# Patient Record
Sex: FEMALE | Race: BLACK OR AFRICAN AMERICAN | NOT HISPANIC OR LATINO | Employment: PART TIME | URBAN - METROPOLITAN AREA
[De-identification: names, ages, dates, MRNs, and addresses within clinical notes are randomized per-mention and may not be internally consistent; named-entity substitution may affect disease eponyms.]

---

## 2017-06-30 ENCOUNTER — HOSPITAL ENCOUNTER (EMERGENCY)
Facility: HOSPITAL | Age: 32
Discharge: HOME | End: 2017-07-01
Attending: EMERGENCY MEDICINE | Admitting: EMERGENCY MEDICINE

## 2017-06-30 DIAGNOSIS — R00.2 PALPITATIONS: Primary | ICD-10-CM

## 2017-06-30 PROCEDURE — 99284 EMERGENCY DEPT VISIT MOD MDM: CPT

## 2017-06-30 PROCEDURE — 96360 HYDRATION IV INFUSION INIT: CPT

## 2017-07-01 ENCOUNTER — APPOINTMENT (OUTPATIENT)
Dept: GENERAL RADIOLOGY | Facility: HOSPITAL | Age: 32
End: 2017-07-01

## 2017-07-01 ENCOUNTER — APPOINTMENT (OUTPATIENT)
Dept: CARDIOLOGY | Facility: HOSPITAL | Age: 32
End: 2017-07-01

## 2017-07-01 VITALS
BODY MASS INDEX: 40.49 KG/M2 | HEART RATE: 106 BPM | WEIGHT: 258 LBS | HEIGHT: 67 IN | OXYGEN SATURATION: 98 % | SYSTOLIC BLOOD PRESSURE: 160 MMHG | DIASTOLIC BLOOD PRESSURE: 82 MMHG | TEMPERATURE: 98.8 F | RESPIRATION RATE: 16 BRPM

## 2017-07-01 LAB
ALBUMIN SERPL-MCNC: 4.3 G/DL (ref 3.5–5.2)
ALBUMIN/GLOB SERPL: 1.4 G/DL
ALP SERPL-CCNC: 82 U/L (ref 39–117)
ALT SERPL W P-5'-P-CCNC: 13 U/L (ref 1–33)
ANION GAP SERPL CALCULATED.3IONS-SCNC: 12.3 MMOL/L
AST SERPL-CCNC: 15 U/L (ref 1–32)
BASOPHILS # BLD AUTO: 0.02 10*3/MM3 (ref 0–0.2)
BASOPHILS NFR BLD AUTO: 0.3 % (ref 0–1.5)
BILIRUB SERPL-MCNC: 0.3 MG/DL (ref 0.1–1.2)
BUN BLD-MCNC: 7 MG/DL (ref 6–20)
BUN/CREAT SERPL: 8.3 (ref 7–25)
CALCIUM SPEC-SCNC: 9 MG/DL (ref 8.6–10.5)
CHLORIDE SERPL-SCNC: 100 MMOL/L (ref 98–107)
CO2 SERPL-SCNC: 25.7 MMOL/L (ref 22–29)
CREAT BLD-MCNC: 0.84 MG/DL (ref 0.57–1)
DEPRECATED RDW RBC AUTO: 42.4 FL (ref 37–54)
EOSINOPHIL # BLD AUTO: 0.14 10*3/MM3 (ref 0–0.7)
EOSINOPHIL NFR BLD AUTO: 2.1 % (ref 0.3–6.2)
ERYTHROCYTE [DISTWIDTH] IN BLOOD BY AUTOMATED COUNT: 13.2 % (ref 11.7–13)
GFR SERPL CREATININE-BSD FRML MDRD: 96 ML/MIN/1.73
GLOBULIN UR ELPH-MCNC: 3.1 GM/DL
GLUCOSE BLD-MCNC: 118 MG/DL (ref 65–99)
HCG SERPL QL: NEGATIVE
HCT VFR BLD AUTO: 39.2 % (ref 35.6–45.5)
HGB BLD-MCNC: 12.4 G/DL (ref 11.9–15.5)
IMM GRANULOCYTES # BLD: 0 10*3/MM3 (ref 0–0.03)
IMM GRANULOCYTES NFR BLD: 0 % (ref 0–0.5)
LYMPHOCYTES # BLD AUTO: 1.99 10*3/MM3 (ref 0.9–4.8)
LYMPHOCYTES NFR BLD AUTO: 29.6 % (ref 19.6–45.3)
MCH RBC QN AUTO: 27.5 PG (ref 26.9–32)
MCHC RBC AUTO-ENTMCNC: 31.6 G/DL (ref 32.4–36.3)
MCV RBC AUTO: 86.9 FL (ref 80.5–98.2)
MONOCYTES # BLD AUTO: 0.34 10*3/MM3 (ref 0.2–1.2)
MONOCYTES NFR BLD AUTO: 5.1 % (ref 5–12)
NEUTROPHILS # BLD AUTO: 4.23 10*3/MM3 (ref 1.9–8.1)
NEUTROPHILS NFR BLD AUTO: 62.9 % (ref 42.7–76)
PLATELET # BLD AUTO: 291 10*3/MM3 (ref 140–500)
PMV BLD AUTO: 11.9 FL (ref 6–12)
POTASSIUM BLD-SCNC: 3.9 MMOL/L (ref 3.5–5.2)
PROT SERPL-MCNC: 7.4 G/DL (ref 6–8.5)
RBC # BLD AUTO: 4.51 10*6/MM3 (ref 3.9–5.2)
SODIUM BLD-SCNC: 138 MMOL/L (ref 136–145)
TROPONIN T SERPL-MCNC: <0.01 NG/ML (ref 0–0.03)
TSH SERPL DL<=0.05 MIU/L-ACNC: 1.77 MIU/ML (ref 0.27–4.2)
WBC NRBC COR # BLD: 6.72 10*3/MM3 (ref 4.5–10.7)

## 2017-07-01 PROCEDURE — 84703 CHORIONIC GONADOTROPIN ASSAY: CPT | Performed by: PHYSICIAN ASSISTANT

## 2017-07-01 PROCEDURE — 0296T HC EXT ECG > 48HR TO 21 DAY RCRD W/CONECT INTL RCRD: CPT

## 2017-07-01 PROCEDURE — 96360 HYDRATION IV INFUSION INIT: CPT

## 2017-07-01 PROCEDURE — 71010 HC CHEST PA OR AP: CPT

## 2017-07-01 PROCEDURE — 84443 ASSAY THYROID STIM HORMONE: CPT | Performed by: PHYSICIAN ASSISTANT

## 2017-07-01 PROCEDURE — 93005 ELECTROCARDIOGRAM TRACING: CPT | Performed by: PHYSICIAN ASSISTANT

## 2017-07-01 PROCEDURE — 84484 ASSAY OF TROPONIN QUANT: CPT | Performed by: PHYSICIAN ASSISTANT

## 2017-07-01 PROCEDURE — 85025 COMPLETE CBC W/AUTO DIFF WBC: CPT | Performed by: PHYSICIAN ASSISTANT

## 2017-07-01 PROCEDURE — 80053 COMPREHEN METABOLIC PANEL: CPT | Performed by: PHYSICIAN ASSISTANT

## 2017-07-01 PROCEDURE — 93010 ELECTROCARDIOGRAM REPORT: CPT | Performed by: INTERNAL MEDICINE

## 2017-07-01 RX ORDER — ATENOLOL 25 MG/1
25 TABLET ORAL DAILY
Qty: 30 TABLET | Refills: 0 | Status: SHIPPED | OUTPATIENT
Start: 2017-07-01 | End: 2017-07-07

## 2017-07-01 RX ORDER — SODIUM CHLORIDE 0.9 % (FLUSH) 0.9 %
10 SYRINGE (ML) INJECTION AS NEEDED
Status: DISCONTINUED | OUTPATIENT
Start: 2017-07-01 | End: 2017-07-01 | Stop reason: HOSPADM

## 2017-07-01 RX ADMIN — SODIUM CHLORIDE 1000 ML: 9 INJECTION, SOLUTION INTRAVENOUS at 01:37

## 2017-07-01 NOTE — ED NOTES
Pt unable to remain standing for last set of orthostatic BP due to dizziness.      Christiana Becerril  07/01/17 0133

## 2017-07-01 NOTE — DISCHARGE INSTRUCTIONS
Home, rest, medicine as prescribed, follow up with cardiologist and PCP for further evaluation and recheck. Return to care with further concerns.

## 2017-07-01 NOTE — ED TRIAGE NOTES
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 NURSE SAID UPSTAIRS HEART RATE

## 2017-07-01 NOTE — ED PROVIDER NOTES
Pt presents to the ED c/o palpitations, diaphresis, and lightheadedness that began tonight working. Pt states that she has had palpitations in the past while pregnant, which were treated with a beta blocker. She denies CP, SOA, and leg swelling.  On exam, cardiovascular- heart is mildly tachycardic with regular rhythm. I agree with the plan for labs and EKG.     EKG           EKG time: 01:12  Rhythm/Rate: NSR 97  P waves and ME: nml  QRS, axis: nml   ST and T waves: flat t waves in V2 and V3     Interpreted Contemporaneously by me, independently viewed  No prior for comparison      Attestation:  I supervised care provided by the midlevel provider.    We have discussed this patient's history, physical exam, and treatment plan.    I have reviewed the note and personally saw and examined the patient and agree with the plan of care.  I agree with the midlevel provider's findings and plan. I reviewed the midlevel providers note.     Documentation assistance provided by mason Cano for Dr Gerard. Information recorded by the mason was done at my direction and has been verified and validated by me.     Doreen Cano  07/01/17 0234       Raúl Gerard MD  07/01/17 0516

## 2017-07-01 NOTE — ED PROVIDER NOTES
" EMERGENCY DEPARTMENT ENCOUNTER    CHIEF COMPLAINT  Chief Complaint: Dizziness  History given by: Patient  History limited by:   Room Number: 02/02  PMD: No Known Provider      HPI:  Pt is a 32 y.o. female who presents complaining of dizziness that onset today while at work. She describes the dizziness as a lightheadedness and felt near-syncopal. Pt reports that she became diaphoretic and had palpitations described as a racing. She has had a previous episode and was evaluated by a cardiologist. She reports some chest tenderness and some paresthesia of the lips and fingers. She reports the paresthesia has resolved.    Duration: PTA  Onset: sudden  Timing: constant  Quality: \"felt like I was going to pass out\"  Intensity/Severity: moderate  Progression: improved  Associated Symptoms: palpitations, chest tenderness, paresthesia  Aggravating Factors: none  Alleviating Factors: none  Previous Episodes: similar episode and was evaluated by cardiologist.   Treatment before arrival: none    PAST MEDICAL HISTORY  Active Ambulatory Problems     Diagnosis Date Noted   • No Active Ambulatory Problems     Resolved Ambulatory Problems     Diagnosis Date Noted   • No Resolved Ambulatory Problems     Past Medical History:   Diagnosis Date   • Tachycardia        PAST SURGICAL HISTORY  History reviewed. No pertinent surgical history.    FAMILY HISTORY  History reviewed. No pertinent family history.    SOCIAL HISTORY  Social History     Social History   • Marital status: Single     Spouse name: N/A   • Number of children: N/A   • Years of education: N/A     Occupational History   • Not on file.     Social History Main Topics   • Smoking status: Never Smoker   • Smokeless tobacco: Not on file   • Alcohol use No   • Drug use: No   • Sexual activity: Defer     Other Topics Concern   • Not on file     Social History Narrative       ALLERGIES  Review of patient's allergies indicates no known allergies.    REVIEW OF SYSTEMS  Review of " Systems   Constitutional: Negative for fever.   HENT: Negative for sore throat.    Eyes: Negative.    Respiratory: Negative for cough and shortness of breath.    Cardiovascular: Positive for chest pain (tenderness) and palpitations.   Gastrointestinal: Negative for abdominal pain, diarrhea and vomiting.   Genitourinary: Negative for dysuria.   Musculoskeletal: Negative for neck pain.   Skin: Negative for rash.   Allergic/Immunologic: Negative.    Neurological: Positive for light-headedness and numbness (paresthesia). Negative for weakness and headaches.   Hematological: Negative.    Psychiatric/Behavioral: Negative.    All other systems reviewed and are negative.      PHYSICAL EXAM  ED Triage Vitals   Temp Heart Rate Resp BP SpO2   06/30/17 2329 06/30/17 2329 06/30/17 2329 06/30/17 2356 06/30/17 2329   98.8 °F (37.1 °C) 116 18 157/90 100 %      Temp src Heart Rate Source Patient Position BP Location FiO2 (%)   06/30/17 2329 06/30/17 2329 -- -- --   Tympanic Monitor          Physical Exam   Constitutional: She is oriented to person, place, and time and well-developed, well-nourished, and in no distress. No distress.   HENT:   Head: Normocephalic and atraumatic.   Eyes: EOM are normal. Pupils are equal, round, and reactive to light.   Neck: Normal range of motion. Neck supple.   Cardiovascular: Regular rhythm and normal heart sounds.  Tachycardia present.    Pulmonary/Chest: Effort normal and breath sounds normal. No respiratory distress.   Abdominal: Soft. There is no tenderness. There is no rebound and no guarding.   Musculoskeletal: Normal range of motion. She exhibits no edema.   Neurological: She is alert and oriented to person, place, and time. She has normal sensation and normal strength.   Skin: Skin is warm and dry. No rash noted.   Psychiatric: Mood and affect normal.   Nursing note and vitals reviewed.      LAB RESULTS  Lab Results (last 24 hours)     Procedure Component Value Units Date/Time    CBC &  Differential [[id number]] Collected:  07/01/17 0137    Specimen:  Blood Updated:  07/01/17 0200    Narrative:       The following orders were created for panel order CBC & Differential.  Procedure                               Abnormality         Status                     ---------                               -----------         ------                     CBC Auto Differential[[id number]]        Abnormal            Final result                 Please view results for these tests on the individual orders.    Comprehensive Metabolic Panel [[id number]]  (Abnormal) Collected:  07/01/17 0137    Specimen:  Blood Updated:  07/01/17 0219     Glucose 118 (H) mg/dL      BUN 7 mg/dL      Creatinine 0.84 mg/dL      Sodium 138 mmol/L      Potassium 3.9 mmol/L      Chloride 100 mmol/L      CO2 25.7 mmol/L      Calcium 9.0 mg/dL      Total Protein 7.4 g/dL      Albumin 4.30 g/dL      ALT (SGPT) 13 U/L      AST (SGOT) 15 U/L      Alkaline Phosphatase 82 U/L      Total Bilirubin 0.3 mg/dL      eGFR  African Amer 96 mL/min/1.73      Globulin 3.1 gm/dL      A/G Ratio 1.4 g/dL      BUN/Creatinine Ratio 8.3     Anion Gap 12.3 mmol/L     hCG, Serum, Qualitative [[id number]]  (Normal) Collected:  07/01/17 0137    Specimen:  Blood Updated:  07/01/17 0226     HCG Qualitative Negative    Troponin [[id number]]  (Normal) Collected:  07/01/17 0137    Specimen:  Blood Updated:  07/01/17 0226     Troponin T <0.010 ng/mL     Narrative:       Troponin T Reference Ranges:  Less than 0.03 ng/mL:    Negative for AMI  0.03 to 0.09 ng/mL:      Indeterminant for AMI  Greater than 0.09 ng/mL: Positive for AMI    TSH [[id number]]  (Normal) Collected:  07/01/17 0137    Specimen:  Blood Updated:  07/01/17 0226     TSH 1.770 mIU/mL     CBC Auto Differential [[id number]]  (Abnormal) Collected:  07/01/17 0137    Specimen:  Blood Updated:  07/01/17 0200     WBC 6.72 10*3/mm3      RBC 4.51 10*6/mm3      Hemoglobin 12.4 g/dL      Hematocrit 39.2 %      MCV 86.9 fL       MCH 27.5 pg      MCHC 31.6 (L) g/dL      RDW 13.2 (H) %      RDW-SD 42.4 fl      MPV 11.9 fL      Platelets 291 10*3/mm3      Neutrophil % 62.9 %      Lymphocyte % 29.6 %      Monocyte % 5.1 %      Eosinophil % 2.1 %      Basophil % 0.3 %      Immature Grans % 0.0 %      Neutrophils, Absolute 4.23 10*3/mm3      Lymphocytes, Absolute 1.99 10*3/mm3      Monocytes, Absolute 0.34 10*3/mm3      Eosinophils, Absolute 0.14 10*3/mm3      Basophils, Absolute 0.02 10*3/mm3      Immature Grans, Absolute 0.00 10*3/mm3           I ordered the above labs and reviewed the results    RADIOLOGY  XR Chest 1 View   Preliminary Result   No acute findings.                   I ordered the above noted radiological studies. Interpreted by radiologist. Discussed with radiologist. Reviewed by me in PACS.       PROCEDURES  Procedures  See MD note for EKG interpretation.     PROGRESS AND CONSULTS  ED Course   1:00 AM  Blood work, CXR, EKG ordered for further evaluation.   2:27 AM  Discussed pt with Dr. Gerard. Dr. Gerard has seen and evaluated pt and agrees with tx plan.   3:11 AM  Rechecked with pt. Informed pt of her negative workup and plan to discharge. Instructed pt to follow up. Pt understands and agrees with plan. All concerns addressed.       MEDICAL DECISION MAKING      MDM  Number of Diagnoses or Management Options  Palpitations:      Amount and/or Complexity of Data Reviewed  Clinical lab tests: reviewed and ordered  Tests in the radiology section of CPT®: ordered and reviewed  Tests in the medicine section of CPT®: reviewed and ordered (EKG - See EKG procedure note  )  Independent visualization of images, tracings, or specimens: yes           DIAGNOSIS  Final diagnoses:   Palpitations       DISPOSITION  DISCHARGE    Patient discharged in stable condition.    Reviewed implications of results, diagnosis, meds, responsibility to follow up, warning signs and symptoms of possible worsening, potential complications and reasons to  return to ER.    Patient/Family voiced understanding of above instructions.    Discussed plan for discharge, as there is no emergent indication for admission.  Pt/family is agreeable and understands need for follow up and repeat testing.  Pt is aware that discharge does not mean that nothing is wrong but it indicates no emergency is present that requires admission and they must continue care with follow-up as given below or physician of their choice.     FOLLOW-UP  Will Alvarez MD  [address]  [address]  [place] [id number]  [phone]    Schedule an appointment as soon as possible for a visit      [facility]  [place] [address]  [phone]  Schedule an appointment as soon as possible for a visit           Medication List      New Prescriptions          atenolol 25 MG tablet   Commonly known as:  TENORMIN   Take 1 tablet by mouth Daily.               Latest Documented Vital Signs:  As of 3:50 AM  BP- 160/82 HR- 106 Temp- 98.8 °F (37.1 °C) (Tympanic) O2 sat- 98%    --  Documentation assistance provided by mason Kaur for Jc Parker PA-C.  Information recorded by the mason was done at my direction and has been verified and validated by me.     Momo Kaur  07/01/17 0142       Momo Kaur  07/01/17 0312       NANCY Marinelli  07/01/17 0350

## 2017-07-07 ENCOUNTER — OFFICE VISIT (OUTPATIENT)
Dept: CARDIOLOGY | Facility: CLINIC | Age: 32
End: 2017-07-07

## 2017-07-07 VITALS
BODY MASS INDEX: 41.75 KG/M2 | DIASTOLIC BLOOD PRESSURE: 100 MMHG | WEIGHT: 266 LBS | SYSTOLIC BLOOD PRESSURE: 110 MMHG | HEIGHT: 67 IN | HEART RATE: 89 BPM

## 2017-07-07 DIAGNOSIS — R00.2 PALPITATIONS: Primary | ICD-10-CM

## 2017-07-07 DIAGNOSIS — R42 DIZZINESS: ICD-10-CM

## 2017-07-07 DIAGNOSIS — R55 NEAR SYNCOPE: ICD-10-CM

## 2017-07-07 PROCEDURE — 93000 ELECTROCARDIOGRAM COMPLETE: CPT | Performed by: NURSE PRACTITIONER

## 2017-07-07 PROCEDURE — 99203 OFFICE O/P NEW LOW 30 MIN: CPT | Performed by: NURSE PRACTITIONER

## 2017-07-07 NOTE — PATIENT INSTRUCTIONS
Turn in monitor  Stop atenolol   Metoprolol tartrate 25 mg 1 tablet PO BID         Palpitations    A palpitation is the feeling that your heartbeat is irregular or is faster than normal. It may feel like your heart is fluttering or skipping a beat. Palpitations are usually not a serious problem. They may be caused by many things, including smoking, caffeine, alcohol, stress, and certain medicines. Although most causes of palpitations are not serious, palpitations can be a sign of a serious medical problem. In some cases, you may need further medical evaluation.  HOME CARE INSTRUCTIONS  Pay attention to any changes in your symptoms. Take these actions to help with your condition:  · Avoid the following:    Caffeinated coffee, tea, soft drinks, diet pills, and energy drinks.    Chocolate.    Alcohol.  · Do not use any tobacco products, such as cigarettes, chewing tobacco, and e-cigarettes. If you need help quitting, ask your health care provider.  · Try to reduce your stress and anxiety. Things that can help you relax include:    Yoga.    Meditation.    Physical activity, such as swimming, jogging, or walking.    Biofeedback. This is a method that helps you learn to use your mind to control things in your body, such as your heartbeats.  · Get plenty of rest and sleep.  · Take over-the-counter and prescription medicines only as told by your health care provider.  · Keep all follow-up visits as told by your health care provider. This is important.  SEEK MEDICAL CARE IF:  · You continue to have a fast or irregular heartbeat after 24 hours.  · Your palpitations occur more often.  SEEK IMMEDIATE MEDICAL CARE IF:  · You have chest pain or shortness of breath.  · You have a severe headache.  · You feel dizzy or you faint.     This information is not intended to replace advice given to you by your health care provider. Make sure you discuss any questions you have with your health care provider.     Document Released:  12/15/2001 Document Revised: 04/10/2017 Document Reviewed: 09/01/2016  Elsevier Interactive Patient Education ©2017 [employer]

## 2017-07-10 ENCOUNTER — OFFICE VISIT (OUTPATIENT)
Dept: FAMILY MEDICINE CLINIC | Facility: CLINIC | Age: 32
End: 2017-07-10

## 2017-07-10 VITALS
DIASTOLIC BLOOD PRESSURE: 78 MMHG | TEMPERATURE: 98 F | HEIGHT: 67 IN | SYSTOLIC BLOOD PRESSURE: 132 MMHG | WEIGHT: 267 LBS | OXYGEN SATURATION: 96 % | BODY MASS INDEX: 41.91 KG/M2 | HEART RATE: 98 BPM

## 2017-07-10 DIAGNOSIS — Z00.00 ROUTINE GENERAL MEDICAL EXAMINATION AT A HEALTH CARE FACILITY: Primary | ICD-10-CM

## 2017-07-10 PROBLEM — R42 DIZZINESS: Status: ACTIVE | Noted: 2017-07-10

## 2017-07-10 PROBLEM — R00.2 PALPITATIONS: Status: ACTIVE | Noted: 2017-07-10

## 2017-07-10 PROBLEM — R55 NEAR SYNCOPE: Status: ACTIVE | Noted: 2017-07-10

## 2017-07-10 LAB
25(OH)D3+25(OH)D2 SERPL-MCNC: 12.1 NG/ML (ref 30–100)
CHOLEST SERPL-MCNC: 155 MG/DL (ref 0–200)
HBA1C MFR BLD: 4.93 % (ref 4.8–5.6)
HDLC SERPL-MCNC: 41 MG/DL (ref 40–60)
LDLC SERPL CALC-MCNC: 105 MG/DL (ref 0–100)
TRIGL SERPL-MCNC: 46 MG/DL (ref 0–150)
VIT B12 SERPL-MCNC: 365 PG/ML (ref 211–946)
VLDLC SERPL CALC-MCNC: 9.2 MG/DL (ref 5–40)

## 2017-07-10 PROCEDURE — 99204 OFFICE O/P NEW MOD 45 MIN: CPT | Performed by: NURSE PRACTITIONER

## 2017-07-10 RX ORDER — MEDROXYPROGESTERONE ACETATE 150 MG/ML
150 INJECTION, SUSPENSION INTRAMUSCULAR
Qty: 1 ML | Refills: 0
Start: 2017-07-10 | End: 2018-07-05

## 2017-07-10 NOTE — PROGRESS NOTES
Date of Office Visit: 2017  Encounter Provider: ROXI Sanderson  Place of Service: [facility] CARDIOLOGY  Patient Name: Joshua Ceja  :1985    Chief Complaint   Patient presents with   • Syncope     Near Syncope - ED follow up    :     HPI: Joshua Ceja is a 32 y.o. female who presents today for evaluation of palpitations and near syncope.  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  She became dizzy, had palpitations in which she felt her heart was pounding, diaphoresis, lips were tingling, and became near syncopal.  She said her blood pressure was checked and was 159/124 and heart rate was 124 bpm.  Her coworkers recommended that she go down to the emergency department and was evaluated that day on 17.    Upon review of the ED records, her blood pressure was 157/90 and heart rate 116.  Comprehensive metabolic panel, pregnancy test, troponin, TSH, hemoglobin, hematocrit, chest x-ray were all within normal limits.  EKG showed normal sinus rhythm with nonspecific ST-T abnormalities.  The patient was recommended to start atenolol, where a 14 day Zio patch monitor, and follow-up in our office for further evaluation.     Since the episode the patient had some mild midsternal chest tightness that occurs both with rest and exertion.  This resolved spontaneously on its own and she denies any associated symptoms.  He is experience some shortness of breath, palpitations, and dizziness.  She's felt fatigued and weak.  She has also noticed some blood coming out of her right nipple.  She plans to follow-up with her OB physician on Tuesday.  She denies paroxysmal nocturnal dyspnea, orthopnea, cough, edema, or true syncope.    She also explains that she had palpitations when pregnant in  and was evaluated by Dr. Russell Rollins.  At that time she was placed on metoprolol tartrate and her palpitations subsided.  She  had a 2-D echocardiogram completed 10/21/2015 with the following results: Ejection fraction 65-70%, trace mitral regurgitation, and trace tricuspid regurgitation.  She also wore a 30 day monitor in November 2015 in which she reported heart racing, dizziness, and chest pain.  These episodes corresponded to sinus tachycardia, except for 2 episodes that showed normal sinus rhythm with an isolated PVC.  There was no evidence of atrial or ventricular arrhythmias.    Past Medical History:   Diagnosis Date   • Morbid (severe) obesity due to excess calories 2/16/2016   • Near syncope    • Palpitations    • Sprain of ankle 2/16/2016   • Syncope 10/21/2015   • Tachycardia        Past Surgical History:   Procedure Laterality Date   • NO PAST SURGERIES         Social History     Social History   • Marital status: Single     Spouse name: N/A   • Number of children: N/A   • Years of education: N/A     Occupational History   • Not on file.     Social History Main Topics   • Smoking status: Never Smoker   • Smokeless tobacco: Not on file   • Alcohol use No   • Drug use: No   • Sexual activity: Defer     Other Topics Concern   • Not on file     Social History Narrative       Family History   Problem Relation Age of Onset   • Heart attack Maternal Grandmother    • Heart attack Paternal Grandmother        Review of Systems   Constitution: Positive for weakness and malaise/fatigue. Negative for chills, diaphoresis, fever, night sweats, weight gain and weight loss.   HENT: Negative for hearing loss, nosebleeds, sore throat and tinnitus.    Eyes: Negative for blurred vision, double vision, pain and visual disturbance.   Cardiovascular: Positive for chest pain, near-syncope and palpitations. Negative for claudication, cyanosis, dyspnea on exertion, irregular heartbeat, leg swelling, orthopnea, paroxysmal nocturnal dyspnea and syncope.   Respiratory: Negative for cough, hemoptysis, shortness of breath, snoring and wheezing.    Endocrine:  "Negative for cold intolerance, heat intolerance and polyuria.   Hematologic/Lymphatic: Negative for bleeding problem. Does not bruise/bleed easily.   Skin: Negative for color change, dry skin, flushing and itching.   Musculoskeletal: Negative for falls, joint pain, joint swelling, muscle cramps, muscle weakness and myalgias.   Gastrointestinal: Negative for abdominal pain, constipation, heartburn, melena, nausea and vomiting.   Genitourinary: Negative for dysuria and hematuria.   Neurological: Positive for dizziness. Negative for excessive daytime sleepiness, light-headedness, loss of balance, numbness, paresthesias, seizures and vertigo.   Psychiatric/Behavioral: Negative for altered mental status, depression, memory loss and substance abuse. The patient does not have insomnia and is not nervous/anxious.    Allergic/Immunologic: Negative for environmental allergies.       No Known Allergies      Current Outpatient Prescriptions:   •  metoprolol tartrate (LOPRESSOR) 25 MG tablet, Take 1 tablet by mouth 2 (Two) Times a Day., Disp: 60 tablet, Rfl: 11     Objective:     Vitals:    07/07/17 1337   BP: 110/100   BP Location: Left arm   Pulse: 89   Weight: 266 lb (121 kg)   Height: 67\" (170.2 cm)     Body mass index is 41.66 kg/(m^2).    PHYSICAL EXAM:    Vitals Reviewed.   General Appearance: No acute distress, well developed and well nourished. Obese.  Eyes: Conjunctiva and lids: No erythema, swelling, or discharge. Sclera non-icteric.  Asses.  HENT: Atraumatic, normocephalic. External eyes, ears, and nose normal. No hearing loss noted. Mucous membranes normal. Lips not cyanotic. Neck supple with no tenderness.  Respiratory: No signs of respiratory distress. Respiration rhythm and depth normal.   Clear to auscultation. No rales, crackles, rhonchi, or wheezing auscultated.   Cardiovascular:  Jugular Venous Pressure: Normal  Heart Rate and Rhythm: Normal, Heart Sounds: Normal S1 and S2. No S3 or S4 noted.  Murmurs: No " murmurs noted. No rubs, thrills, or gallops.   Arterial Pulses: Carotid pulses normal. No carotid bruit noted. Posterior tibialis and dorsalis pedis pulses normal.   Lower Extremities: No edema noted.  Gastrointestinal:  Abdomen soft, non-distended, non-tender. Normal bowel sounds. No hepatomegaly.   Musculoskeletal: Normal movement of extremities  Skin and Nails: General appearance normal. No pallor, cyanosis, diaphoresis. Skin temperature normal. No clubbing of fingernails.   Psychiatric: Patient alert and oriented to person, place, and time. Speech and behavior appropriate. Normal mood and affect.       ECG 12 Lead  Date/Time: 7/7/2017 1:34 PM  Performed by: CAMERON SZYMANSKI  Authorized by: CAMERON SZYMANSKI   Comparison: compared with previous ECG from 6/30/2017  Rhythm: sinus rhythm  Rate: normal  BPM: 89  QRS axis: normal  Clinical impression: non-specific ECG  Comments: T wave flattening              Assessment:       Diagnosis Plan   1. Palpitations     2. Dizziness     3. Near syncope            Plan:       Ms. Ceja is a for evaluation of palpitations, dizziness, near syncope.  She states these episodes are similar to what she had in 2015 while pregnant.  At that time she was treated with metoprolol tartrate and her palpitations subsided.  She came off the beta blocker after she was pregnant.  She recently has been experiencing chest tightness, shortness of air, palpitations, dizziness, near syncope.  She was discharged on a 14 day Zio patch monitor and has already documented 16 events.  Says she's had some events, Dr. Hernandez's that she can go ahead and turn the monitor and.  The patient states that her event on 7/4/17 was the worst and lasted one hour.    She does not feel that the atenolol is working for her.  I have recommended switching her to metoprolol tartrate 25 mg one tablet twice a day.  I also had a long discussion with her with risk factor modification for palpitations.  She drinks a lot of  caffeine during the day and have asked her to slowly decrease this.  I've also asked her to avoid any stimulants that would cause her palpitations to worsen.  We also discussed the benefit of weight loss in a regular exercise program.    The medical assistant checked her blood pressure and stated it was 110/100 and I do not believe this reading.  I was able to check both of her arms and received a systolic reading around 120.  However I was not able to obtain the diastolic reading.  I did use a large blood pressure cuff.    As always, it has been a pleasure to participate in your patient's care.      Sincerely,         ROXI Alarcon

## 2017-07-10 NOTE — PROGRESS NOTES
Subjective   Joshua Ceja is a 32 y.o. female. She has not been seen by a pcp in many years. Follow up from ED due to palpitations.  She started having palpitations while at work at [employer] on Fri night over week ago. She got hot, flushed, and felt her heart was racing. Was seen, placed on heart monitor, d/c home after this. She follow up with the cardiologist and was changed to metoprolol which seems to have slowed the heartrate down and she is feeling better.  She is still concerned that her blood pressure is up a little.   She is having some intermittent right ear pain. And headaches since the ED visit and thinks it's because of the blood pressure.  She is having a dark brownish drainage from the right nipple. She does have a visit scheduled with the OB/GYN for today. Will follow up     Palpitations    This is a new problem. The current episode started more than 1 month ago. The problem occurs intermittently. Nothing aggravates the symptoms. She has tried beta blockers for the symptoms. The treatment provided significant relief. Risk factors include obesity, dyslipidemia and family history.        The following portions of the patient's history were reviewed and updated as appropriate: allergies, current medications, past family history, past medical history, past social history, past surgical history and problem list.    Review of Systems   Constitutional: Positive for appetite change.   HENT: Positive for ear pain (chronic right ear pain).    Eyes: Negative.    Respiratory: Negative.    Cardiovascular: Positive for palpitations and leg swelling.   Gastrointestinal: Negative.    Endocrine: Negative.    Genitourinary:        Follows with gyn for annual care     Musculoskeletal: Negative.    Skin:        Drainage from the right nipple   Allergic/Immunologic: Negative.    Neurological: Negative.    Hematological: Negative.    Psychiatric/Behavioral: Negative.        Objective   Physical Exam   Constitutional:  She is oriented to person, place, and time. She appears well-developed and well-nourished.   HENT:   Right Ear: Hearing normal. A middle ear effusion (cloudy) is present.   Left Ear: Hearing and tympanic membrane normal.   Eyes: Conjunctivae, EOM and lids are normal. Pupils are equal, round, and reactive to light.   Cardiovascular: Normal rate, regular rhythm, normal heart sounds, intact distal pulses and normal pulses.    1+ pedal edema bilaterally   Pulmonary/Chest: Effort normal and breath sounds normal.   Abdominal: Soft. Normal appearance and bowel sounds are normal.   Musculoskeletal: Normal range of motion.   Neurological: She is alert and oriented to person, place, and time. She has normal strength.   Skin: Skin is warm, dry and intact.   Psychiatric: She has a normal mood and affect. Her behavior is normal. Judgment and thought content normal.   Nursing note and vitals reviewed.    Vitals:    07/10/17 0958   BP: 132/78   Pulse: 98   Temp: 98 °F (36.7 °C)   SpO2: 96%       Assessment/Plan   Diagnoses and all orders for this visit:    Routine general medical examination at a health care facility  -     Vitamin B12  -     Vitamin D 25 Hydroxy  -     Hemoglobin A1c  -     Lipid Panel    Other orders  -     medroxyPROGESTERone (DEPO-PROVERA) 150 MG/ML injection; Inject 1 mL into the shoulder, thigh, or buttocks Every 3 (Three) Months.      She currently see cardiology and will continue with her. May use a low dose of lisinopril or some HCTZ for the pedal edema and to lower her blood pressure a little more. She does admittedly consume too much caffeine so should start to lessen her intake. She would benefit from weight reduction and exercise on regular basis.  Check some labs that were not done in the ED.   Will call lab results.  Follow up as needed.  Plan to use an OTC antihistamine for the ear pain. It appears to me mostly related to allergy symptoms.

## 2017-07-13 DIAGNOSIS — E55.9 VITAMIN D DEFICIENCY: Primary | ICD-10-CM

## 2017-07-13 RX ORDER — ERGOCALCIFEROL 1.25 MG/1
50000 CAPSULE ORAL WEEKLY
Qty: 4 CAPSULE | Refills: 11 | Status: SHIPPED | OUTPATIENT
Start: 2017-07-13 | End: 2018-07-05 | Stop reason: SDUPTHER

## 2017-07-21 ENCOUNTER — TELEPHONE (OUTPATIENT)
Dept: CARDIOLOGY | Facility: CLINIC | Age: 32
End: 2017-07-21

## 2017-07-21 NOTE — TELEPHONE ENCOUNTER
Patient called wanting to know Zio patch results.  I informed pt that they are not available yet and I will have Tara Alvarado NP call her when they are available.  Alma

## 2017-07-24 PROCEDURE — 0298T ZIO PATCH: CPT | Performed by: INTERNAL MEDICINE

## 2017-07-25 ENCOUNTER — TELEPHONE (OUTPATIENT)
Dept: CARDIOLOGY | Facility: CLINIC | Age: 32
End: 2017-07-25

## 2017-07-25 NOTE — TELEPHONE ENCOUNTER
Zio Patch Results:    Study Description  Monitor hooked-up on 7/1/2017 at 03:32 EDT. The monitor was scanned on 7/7/2017. The patient was monitored for 6 days 14 hours. Indications for this exam include palpitations. Average HR: 96. Min HR: 61. Max HR: 179.   Study Findings  No symptoms reported during the monitoring period. No complications noted. The predominant rhythm noted during the testing period was sinus rhythm. Premature atrial contractions occured rarely. Premature ventricular contractions occured rarely. Sinoatrial node conduction was normal. No atrioventricular block noted.   Study Impressions  A normal monitor study.     I tried calling the patient with the results of her testing and her voicemail mailboxes is full.  I will reassess if her palpitations have improved switching from atenolol to metoprolol tartrate 25 mg twice a day.  I will reassess her blood pressure and heart rates via telephone.

## 2017-07-25 NOTE — TELEPHONE ENCOUNTER
----- Message from ROXI Bill sent at 7/21/2017  5:45 PM EDT -----    Results pending    ----- Message -----     From: ROXI Bill     Sent: 7/14/2017   2:07 PM       To: ROXI Bill      Results pending     ----- Message -----     From: ROXI Bill     Sent: 7/10/2017   7:49 AM       To: ROXI Bill      Follow-up on Zio patch monitor placed 6/30/17

## 2017-07-26 NOTE — TELEPHONE ENCOUNTER
Patient informed about results. She is feeling better on the current dose of metoprolol. She would like to follow up me in 6-8 weeks for reassessment. Lorenza - can you please arrange?

## 2017-09-30 ENCOUNTER — APPOINTMENT (OUTPATIENT)
Dept: CT IMAGING | Facility: HOSPITAL | Age: 32
End: 2017-09-30

## 2017-09-30 ENCOUNTER — HOSPITAL ENCOUNTER (EMERGENCY)
Facility: HOSPITAL | Age: 32
Discharge: HOME | End: 2017-09-30
Attending: EMERGENCY MEDICINE | Admitting: EMERGENCY MEDICINE

## 2017-09-30 VITALS
OXYGEN SATURATION: 100 % | HEIGHT: 67 IN | TEMPERATURE: 98.1 F | HEART RATE: 117 BPM | RESPIRATION RATE: 16 BRPM | WEIGHT: 256 LBS | SYSTOLIC BLOOD PRESSURE: 134 MMHG | DIASTOLIC BLOOD PRESSURE: 93 MMHG | BODY MASS INDEX: 40.18 KG/M2

## 2017-09-30 DIAGNOSIS — G43.809 OTHER MIGRAINE WITHOUT STATUS MIGRAINOSUS, NOT INTRACTABLE: Primary | ICD-10-CM

## 2017-09-30 LAB — B-HCG UR QL: NEGATIVE

## 2017-09-30 PROCEDURE — 25010000002 PROCHLORPERAZINE EDISYLATE PER 10 MG: Performed by: EMERGENCY MEDICINE

## 2017-09-30 PROCEDURE — 96375 TX/PRO/DX INJ NEW DRUG ADDON: CPT

## 2017-09-30 PROCEDURE — 96374 THER/PROPH/DIAG INJ IV PUSH: CPT

## 2017-09-30 PROCEDURE — 99284 EMERGENCY DEPT VISIT MOD MDM: CPT

## 2017-09-30 PROCEDURE — 81025 URINE PREGNANCY TEST: CPT | Performed by: PHYSICIAN ASSISTANT

## 2017-09-30 PROCEDURE — 25010000002 DIPHENHYDRAMINE PER 50 MG: Performed by: EMERGENCY MEDICINE

## 2017-09-30 PROCEDURE — 25010000002 KETOROLAC TROMETHAMINE PER 15 MG: Performed by: EMERGENCY MEDICINE

## 2017-09-30 PROCEDURE — 70450 CT HEAD/BRAIN W/O DYE: CPT

## 2017-09-30 RX ORDER — DIPHENHYDRAMINE HYDROCHLORIDE 50 MG/ML
25 INJECTION INTRAMUSCULAR; INTRAVENOUS ONCE
Status: COMPLETED | OUTPATIENT
Start: 2017-09-30 | End: 2017-09-30

## 2017-09-30 RX ORDER — KETOROLAC TROMETHAMINE 30 MG/ML
15 INJECTION, SOLUTION INTRAMUSCULAR; INTRAVENOUS ONCE
Status: COMPLETED | OUTPATIENT
Start: 2017-09-30 | End: 2017-09-30

## 2017-09-30 RX ORDER — SODIUM CHLORIDE 0.9 % (FLUSH) 0.9 %
10 SYRINGE (ML) INJECTION AS NEEDED
Status: DISCONTINUED | OUTPATIENT
Start: 2017-09-30 | End: 2017-09-30 | Stop reason: HOSPADM

## 2017-09-30 RX ADMIN — PROCHLORPERAZINE EDISYLATE 10 MG: 5 INJECTION INTRAMUSCULAR; INTRAVENOUS at 05:03

## 2017-09-30 RX ADMIN — DIPHENHYDRAMINE HYDROCHLORIDE 25 MG: 50 INJECTION, SOLUTION INTRAMUSCULAR; INTRAVENOUS at 05:04

## 2017-09-30 RX ADMIN — KETOROLAC TROMETHAMINE 15 MG: 30 INJECTION, SOLUTION INTRAMUSCULAR at 05:03

## 2017-10-04 ENCOUNTER — OFFICE VISIT (OUTPATIENT)
Dept: FAMILY MEDICINE CLINIC | Facility: CLINIC | Age: 32
End: 2017-10-04

## 2017-10-04 VITALS
HEART RATE: 101 BPM | DIASTOLIC BLOOD PRESSURE: 78 MMHG | HEIGHT: 67 IN | WEIGHT: 265 LBS | TEMPERATURE: 98 F | OXYGEN SATURATION: 98 % | BODY MASS INDEX: 41.59 KG/M2 | SYSTOLIC BLOOD PRESSURE: 132 MMHG

## 2017-10-04 DIAGNOSIS — R51.9 INTRACTABLE HEADACHE, UNSPECIFIED CHRONICITY PATTERN, UNSPECIFIED HEADACHE TYPE: ICD-10-CM

## 2017-10-04 DIAGNOSIS — J02.9 SORE THROAT: ICD-10-CM

## 2017-10-04 DIAGNOSIS — J01.10 ACUTE FRONTAL SINUSITIS, RECURRENCE NOT SPECIFIED: Primary | ICD-10-CM

## 2017-10-04 LAB
EXPIRATION DATE: NORMAL
INTERNAL CONTROL: NORMAL
Lab: NORMAL
S PYO AG THROAT QL: NEGATIVE

## 2017-10-04 PROCEDURE — 87880 STREP A ASSAY W/OPTIC: CPT | Performed by: NURSE PRACTITIONER

## 2017-10-04 PROCEDURE — 99213 OFFICE O/P EST LOW 20 MIN: CPT | Performed by: NURSE PRACTITIONER

## 2017-10-04 PROCEDURE — 96372 THER/PROPH/DIAG INJ SC/IM: CPT | Performed by: NURSE PRACTITIONER

## 2017-10-04 RX ORDER — AMOXICILLIN 875 MG/1
875 TABLET, COATED ORAL 2 TIMES DAILY
Qty: 20 TABLET | Refills: 0 | Status: SHIPPED | OUTPATIENT
Start: 2017-10-04 | End: 2017-10-16 | Stop reason: HOSPADM

## 2017-10-04 RX ORDER — BENZONATATE 100 MG/1
100 CAPSULE ORAL 3 TIMES DAILY PRN
Qty: 30 CAPSULE | Refills: 0 | Status: SHIPPED | OUTPATIENT
Start: 2017-10-04 | End: 2017-10-16

## 2017-10-04 RX ORDER — KETOROLAC TROMETHAMINE 30 MG/ML
30 INJECTION, SOLUTION INTRAMUSCULAR; INTRAVENOUS ONCE
Status: COMPLETED | OUTPATIENT
Start: 2017-10-04 | End: 2017-10-04

## 2017-10-04 RX ORDER — CETIRIZINE HYDROCHLORIDE 10 MG/1
10 TABLET ORAL DAILY
Qty: 30 TABLET | Refills: 0 | Status: SHIPPED | OUTPATIENT
Start: 2017-10-04 | End: 2018-07-05

## 2017-10-04 RX ADMIN — KETOROLAC TROMETHAMINE 30 MG: 30 INJECTION, SOLUTION INTRAMUSCULAR; INTRAVENOUS at 11:07

## 2017-10-04 NOTE — PROGRESS NOTES
Subjective   Joshua Ceja is a 32 y.o. female. She is here for cough and sore throat, she states her kids just got over strep. She has also been having headaches and went to [facility] last week. Her sore throat started last week and has not gone away. She really hasn't taken anything for it.   Headache: She has headache which started last week as well. She went the ED on Sat night because she was at work and had a terrible headache. The RN checked her blood pressure and it was really high. She had CT scan but it was negative. She got Toradol injection and the pain went away but it has since returned. It's located on the left side of her head in the temporal region and radiates back to the left occipital area down into the neck.     Sore Throat    This is a new problem. The current episode started in the past 7 days. The problem has been gradually worsening. There has been no fever. Associated symptoms include coughing, ear pain, headaches and shortness of breath. Pertinent negatives include no swollen glands. She has had exposure to strep. She has tried nothing for the symptoms.   Headache    This is a new problem. The current episode started in the past 7 days. The problem occurs constantly. The problem has been gradually worsening. The pain is located in the left unilateral region. The pain radiates to the left neck. The pain quality is similar to prior headaches. Associated symptoms include coughing, ear pain, sinus pressure and a sore throat. Pertinent negatives include no swollen glands. Nothing aggravates the symptoms.        The following portions of the patient's history were reviewed and updated as appropriate: allergies, current medications, past medical history, past social history, past surgical history and problem list.    Review of Systems   Constitutional: Negative.    HENT: Positive for ear pain, sinus pressure and sore throat.    Respiratory: Positive for cough and shortness of breath.   "  Cardiovascular: Negative.    Neurological: Positive for headaches.       Objective   Physical Exam   Constitutional: She is oriented to person, place, and time. Vital signs are normal. She appears well-developed and well-nourished. She is cooperative.   HENT:   Right Ear: Hearing normal. A middle ear effusion is present.   Left Ear: Hearing normal. A middle ear effusion is present.   Nose: Mucosal edema present. Left sinus exhibits maxillary sinus tenderness and frontal sinus tenderness.   Mouth/Throat: Uvula is midline and mucous membranes are normal. Posterior oropharyngeal edema and posterior oropharyngeal erythema present.   Eyes: Conjunctivae, EOM and lids are normal. Pupils are equal, round, and reactive to light.   Cardiovascular: Normal rate, regular rhythm, intact distal pulses and normal pulses.    Pulmonary/Chest: Effort normal and breath sounds normal.   Neurological: She is alert and oriented to person, place, and time. She has normal strength.   Nursing note and vitals reviewed.    Vitals:    10/04/17 1033   BP: 132/78   Pulse: 101   Temp: 98 °F (36.7 °C)   TempSrc: Oral   SpO2: 98%   Weight: 265 lb (120 kg)   Height: 67\" (170.2 cm)     Results for orders placed or performed in visit on 10/04/17   POC Rapid Strep A   Result Value Ref Range    Rapid Strep A Screen Negative Negative, VALID, INVALID, Not Performed    Internal Control Passed Passed    Lot Number [id number]     Expiration Date 2019/03/31        Assessment/Plan   Diagnoses and all orders for this visit:    Acute frontal sinusitis, recurrence not specified  -     amoxicillin (AMOXIL) 875 MG tablet; Take 1 tablet by mouth 2 (Two) Times a Day.  -     cetirizine (zyrTEC) 10 MG tablet; Take 1 tablet by mouth Daily.  -     benzonatate (TESSALON PERLES) 100 MG capsule; Take 1 capsule by mouth 3 (Three) Times a Day As Needed for Cough.    Intractable headache, unspecified chronicity pattern, unspecified headache type  -     ketorolac (TORADOL) " injection 30 mg; Inject 30 mg into the shoulder, thigh, or buttocks 1 (One) Time.    Sore throat  -     POC Rapid Strep A      Will treat the sinus infection, if headache does not resolve then need to work up the headaches.  Lots of fluids  RTC if not improved

## 2017-10-16 ENCOUNTER — OFFICE VISIT (OUTPATIENT)
Dept: FAMILY MEDICINE CLINIC | Facility: CLINIC | Age: 32
End: 2017-10-16

## 2017-10-16 VITALS
DIASTOLIC BLOOD PRESSURE: 78 MMHG | HEART RATE: 111 BPM | TEMPERATURE: 98.7 F | BODY MASS INDEX: 41.12 KG/M2 | HEIGHT: 67 IN | OXYGEN SATURATION: 99 % | SYSTOLIC BLOOD PRESSURE: 112 MMHG | WEIGHT: 262 LBS

## 2017-10-16 DIAGNOSIS — R11.2 NAUSEA AND VOMITING, INTRACTABILITY OF VOMITING NOT SPECIFIED, UNSPECIFIED VOMITING TYPE: ICD-10-CM

## 2017-10-16 DIAGNOSIS — R05.9 COUGH: ICD-10-CM

## 2017-10-16 DIAGNOSIS — J00 ACUTE NASOPHARYNGITIS: Primary | ICD-10-CM

## 2017-10-16 PROCEDURE — 99213 OFFICE O/P EST LOW 20 MIN: CPT | Performed by: NURSE PRACTITIONER

## 2017-10-16 RX ORDER — AZITHROMYCIN 250 MG/1
TABLET, FILM COATED ORAL
Qty: 6 TABLET | Refills: 0 | Status: SHIPPED | OUTPATIENT
Start: 2017-10-16 | End: 2018-07-05

## 2017-10-16 RX ORDER — PROMETHAZINE HCL/CODEINE 6.25-10/5
5 SYRUP ORAL EVERY 4 HOURS PRN
Qty: 180 ML | Refills: 0 | Status: SHIPPED | OUTPATIENT
Start: 2017-10-16 | End: 2018-07-05

## 2017-10-16 NOTE — PROGRESS NOTES
Subjective   Joshua Ceja is a 32 y.o. female who presents to us today with a dry cough, nausea and vomiting.  Cough continues since the last time she was here (10/4). She was treated with antibiotics but did not improve. Cough is still non productive. Kids started in  a couple of weeks ago and thinks they may be bringing the germs home to her.   Vomited x 2 this morning. Just stomach contents. No real abdominal pain, just nausea.    Cough   This is a new problem. The current episode started 1 to 4 weeks ago. The problem has been gradually worsening. The cough is non-productive. Associated symptoms include ear pain (right ), nasal congestion, postnasal drip and rhinorrhea. Pertinent negatives include no chills or shortness of breath.   Vomiting    This is a new problem. The current episode started today. The problem occurs 2 to 4 times per day. The problem has been unchanged. There has been no fever. Associated symptoms include coughing. Pertinent negatives include no chills.        The following portions of the patient's history were reviewed and updated as appropriate: allergies, current medications, past medical history, past social history, past surgical history and problem list.    Review of Systems   Constitutional: Negative for chills.   HENT: Positive for ear pain (right ), postnasal drip and rhinorrhea.    Respiratory: Positive for cough. Negative for chest tightness and shortness of breath.    Cardiovascular: Negative.    Gastrointestinal: Positive for vomiting.       Objective   Physical Exam   Constitutional: Vital signs are normal. She appears well-developed and well-nourished. She is cooperative.   HENT:   Right Ear: Hearing normal. A middle ear effusion is present.   Left Ear: Hearing normal. A middle ear effusion is present.   Nose: Mucosal edema and rhinorrhea present.   Mouth/Throat: Uvula is midline and mucous membranes are normal. Posterior oropharyngeal erythema present.  "  Cardiovascular: Normal rate, regular rhythm and normal heart sounds.    Pulmonary/Chest: Effort normal and breath sounds normal.   Abdominal: Soft. Normal appearance and bowel sounds are normal. There is no tenderness.   Neurological: She is alert.   Nursing note and vitals reviewed.    Vitals:    10/16/17 1253   BP: 112/78   BP Location: Right arm   Patient Position: Sitting   Cuff Size: Large Adult   Pulse: 111   Temp: 98.7 °F (37.1 °C)   TempSrc: Oral   SpO2: 99%   Weight: 262 lb (119 kg)   Height: 67\" (170.2 cm)       Assessment/Plan   Joshua was seen today for cough, nausea and vomiting.    Diagnoses and all orders for this visit:    Acute nasopharyngitis  -     azithromycin (ZITHROMAX) 250 MG tablet; Take 2 tablets the first day, then 1 tablet daily for 4 days.    Cough  -     promethazine-codeine (PHENERGAN with CODEINE) 6.25-10 MG/5ML syrup; Take 5 mL by mouth Every 4 (Four) Hours As Needed for Cough.    Nausea and vomiting, intractability of vomiting not specified, unspecified vomiting type    Off school.  Instruction for nausea/vomiting:  Clear liquids.  BRAT diet (bananas, rice, applesauce, and toast.  Advance to soft diet then regular diet as tolerated.   If stomach does not settle with the phenergan with codeine, then call back, will call in phenergan tablets.   RTC if not improved         "

## 2017-10-24 ENCOUNTER — TELEPHONE (OUTPATIENT)
Dept: FAMILY MEDICINE CLINIC | Facility: CLINIC | Age: 32
End: 2017-10-24

## 2017-10-26 ENCOUNTER — LAB REQUISITION (OUTPATIENT)
Dept: LAB | Facility: OTHER | Age: 32
End: 2017-10-26

## 2017-10-26 DIAGNOSIS — IMO0001 NEEDLE STICK INJURY OF FINGER, INITIAL ENCOUNTER: ICD-10-CM

## 2017-10-26 LAB
HBV SURFACE AB SER RIA-ACNC: NORMAL
HBV SURFACE AG SERPL QL IA: NORMAL
HCV AB SER DONR QL: NORMAL
HIV1 P24 AG SER QL: NORMAL
HIV1+2 AB SER QL: NORMAL

## 2017-10-26 PROCEDURE — 87899 AGENT NOS ASSAY W/OPTIC: CPT | Performed by: PHYSICAL MEDICINE & REHABILITATION

## 2017-10-26 PROCEDURE — 86803 HEPATITIS C AB TEST: CPT | Performed by: PHYSICAL MEDICINE & REHABILITATION

## 2017-10-26 PROCEDURE — 87340 HEPATITIS B SURFACE AG IA: CPT | Performed by: PHYSICAL MEDICINE & REHABILITATION

## 2017-10-26 PROCEDURE — G0432 EIA HIV-1/HIV-2 SCREEN: HCPCS | Performed by: PHYSICAL MEDICINE & REHABILITATION

## 2017-10-26 PROCEDURE — 86706 HEP B SURFACE ANTIBODY: CPT | Performed by: PHYSICAL MEDICINE & REHABILITATION

## 2018-07-05 ENCOUNTER — OFFICE VISIT (OUTPATIENT)
Dept: FAMILY MEDICINE CLINIC | Facility: CLINIC | Age: 33
End: 2018-07-05

## 2018-07-05 VITALS
BODY MASS INDEX: 41.91 KG/M2 | WEIGHT: 267 LBS | SYSTOLIC BLOOD PRESSURE: 122 MMHG | HEART RATE: 105 BPM | TEMPERATURE: 98.5 F | DIASTOLIC BLOOD PRESSURE: 78 MMHG | OXYGEN SATURATION: 98 % | HEIGHT: 67 IN

## 2018-07-05 DIAGNOSIS — Z02.0 ENCOUNTER FOR SCHOOL EXAMINATION: ICD-10-CM

## 2018-07-05 DIAGNOSIS — Z00.00 ROUTINE GENERAL MEDICAL EXAMINATION AT A HEALTH CARE FACILITY: Primary | ICD-10-CM

## 2018-07-05 DIAGNOSIS — E55.9 VITAMIN D DEFICIENCY: ICD-10-CM

## 2018-07-05 PROBLEM — R42 DIZZINESS: Status: RESOLVED | Noted: 2017-07-10 | Resolved: 2018-07-05

## 2018-07-05 PROBLEM — R55 NEAR SYNCOPE: Status: RESOLVED | Noted: 2017-07-10 | Resolved: 2018-07-05

## 2018-07-05 LAB
25(OH)D3+25(OH)D2 SERPL-MCNC: 16.3 NG/ML (ref 30–100)
ALBUMIN SERPL-MCNC: 4.3 G/DL (ref 3.5–5.2)
ALBUMIN/GLOB SERPL: 1.7 G/DL
ALP SERPL-CCNC: 73 U/L (ref 39–117)
ALT SERPL-CCNC: 9 U/L (ref 1–33)
AST SERPL-CCNC: 10 U/L (ref 1–32)
BILIRUB SERPL-MCNC: 0.4 MG/DL (ref 0.1–1.2)
BUN SERPL-MCNC: 7 MG/DL (ref 6–20)
BUN/CREAT SERPL: 8 (ref 7–25)
CALCIUM SERPL-MCNC: 9.5 MG/DL (ref 8.6–10.5)
CHLORIDE SERPL-SCNC: 103 MMOL/L (ref 98–107)
CHOLEST SERPL-MCNC: 161 MG/DL (ref 0–200)
CO2 SERPL-SCNC: 24.4 MMOL/L (ref 22–29)
CREAT SERPL-MCNC: 0.88 MG/DL (ref 0.57–1)
GLOBULIN SER CALC-MCNC: 2.5 GM/DL
GLUCOSE SERPL-MCNC: 91 MG/DL (ref 65–99)
HBA1C MFR BLD: 5 % (ref 4.8–5.6)
HDLC SERPL-MCNC: 44 MG/DL (ref 40–60)
LDLC SERPL CALC-MCNC: 99 MG/DL (ref 0–100)
POTASSIUM SERPL-SCNC: 4.1 MMOL/L (ref 3.5–5.2)
PROT SERPL-MCNC: 6.8 G/DL (ref 6–8.5)
SODIUM SERPL-SCNC: 140 MMOL/L (ref 136–145)
TRIGL SERPL-MCNC: 88 MG/DL (ref 0–150)
TSH SERPL DL<=0.005 MIU/L-ACNC: 1.12 MIU/ML (ref 0.27–4.2)
VLDLC SERPL CALC-MCNC: 17.6 MG/DL (ref 5–40)

## 2018-07-05 PROCEDURE — 85027 COMPLETE CBC AUTOMATED: CPT | Performed by: NURSE PRACTITIONER

## 2018-07-05 PROCEDURE — 99395 PREV VISIT EST AGE 18-39: CPT | Performed by: NURSE PRACTITIONER

## 2018-07-05 RX ORDER — ERGOCALCIFEROL 1.25 MG/1
50000 CAPSULE ORAL WEEKLY
Qty: 4 CAPSULE | Refills: 11 | Status: SHIPPED | OUTPATIENT
Start: 2018-07-05 | End: 2019-11-01

## 2018-07-05 NOTE — PROGRESS NOTES
Subjective   Joshua Ceja is a 33 y.o. female. Physical. 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 Needs her annual wellness exam and her physical paper filled out for school. She has had some titers done at [facility] so can get copies of those to have for her records here. She is UTD on all her immunizations. She had the Hepatitis A vaccine as well.   Needs health screening labs.    Here today for school physical and her wellness screening. She has no complaints. Taking no medication now. 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          The following portions of the patient's history were reviewed and updated as appropriate: allergies, current medications, past family history, past medical history, past social history, past surgical history and problem list.    Review of Systems   Constitutional: Negative.    HENT: Negative.    Eyes: Negative.    Respiratory: Negative.    Cardiovascular: Negative.    Gastrointestinal: Negative.    Endocrine: Negative.    Musculoskeletal: Negative.    Skin: Negative.    Allergic/Immunologic: Negative.    Neurological: Negative.    Hematological: Negative.    Psychiatric/Behavioral: Negative.        Objective   Physical Exam   Constitutional: She is oriented to person, place, and time. Vital signs are normal. She appears well-developed and well-nourished. She is cooperative. She appears overweight.   HENT:   Right Ear: Hearing and tympanic membrane normal.   Left Ear: Hearing and tympanic membrane normal.   Nose: Nose normal.   Mouth/Throat: Uvula is midline, oropharynx is clear and moist and mucous membranes are normal.   Eyes: Conjunctivae, EOM and lids are normal. Pupils are equal, round, and reactive to light.   Cardiovascular: Normal rate, regular rhythm, normal heart sounds, intact distal pulses and normal pulses.    Pulmonary/Chest: Effort normal and breath sounds  "normal.   Abdominal: Soft. Normal appearance and bowel sounds are normal.   Musculoskeletal: Normal range of motion.   Neurological: She is alert and oriented to person, place, and time. She has normal strength. No cranial nerve deficit or sensory deficit.   Skin: Skin is warm, dry and intact.   Psychiatric: She has a normal mood and affect. Her speech is normal and behavior is normal. Judgment and thought content normal. Cognition and memory are normal.   Nursing note and vitals reviewed.    Vitals:    07/05/18 0907   BP: 122/78   Pulse: 105   Temp: 98.5 °F (36.9 °C)   TempSrc: Oral   SpO2: 98%   Weight: 121 kg (267 lb)   Height: 170.2 cm (67.01\")       Assessment/Plan   Joshua was seen today for annual exam.    Diagnoses and all orders for this visit:    Routine general medical examination at a health care facility  -     Vitamin D 25 Hydroxy  -     TSH  -     Lipid Panel  -     Hemoglobin A1c  -     Comprehensive Metabolic Panel  -     POC CBC    Vitamin D deficiency  -     vitamin D (ERGOCALCIFEROL) 02907 units capsule capsule; Take 1 capsule by mouth 1 (One) Time Per Week.    Encounter for school examination      Will get her labs and immunization records from [facility] to make sure her chart is complete.   Physical form completed and returned to patient  Screening labs.  RTC as needed.         "

## 2018-07-06 ENCOUNTER — TELEPHONE (OUTPATIENT)
Dept: FAMILY MEDICINE CLINIC | Facility: CLINIC | Age: 33
End: 2018-07-06

## 2018-07-06 NOTE — TELEPHONE ENCOUNTER
----- Message from ROXI Peterson sent at 7/6/2018  8:15 AM EDT -----  Call the patient on the lab results. Vitamin D is still low. I have sent the script for the Vitamin D to the pharmacy. Will need to take for at least one year. Thyroid normal. Cholesterol good. A1C is good.  Kidney and liver function normal. Repeat labs as part of annual wellness screening.

## 2018-10-29 ENCOUNTER — OFFICE VISIT (OUTPATIENT)
Dept: FAMILY MEDICINE CLINIC | Facility: CLINIC | Age: 33
End: 2018-10-29

## 2018-10-29 VITALS
OXYGEN SATURATION: 97 % | HEART RATE: 106 BPM | BODY MASS INDEX: 41.12 KG/M2 | HEIGHT: 67 IN | SYSTOLIC BLOOD PRESSURE: 126 MMHG | DIASTOLIC BLOOD PRESSURE: 80 MMHG | WEIGHT: 262 LBS | TEMPERATURE: 98.4 F

## 2018-10-29 DIAGNOSIS — R05.8 POST-VIRAL COUGH SYNDROME: Primary | ICD-10-CM

## 2018-10-29 PROCEDURE — 99213 OFFICE O/P EST LOW 20 MIN: CPT | Performed by: NURSE PRACTITIONER

## 2018-10-29 RX ORDER — PROMETHAZINE HYDROCHLORIDE AND CODEINE PHOSPHATE 6.25; 1 MG/5ML; MG/5ML
5 SYRUP ORAL EVERY 6 HOURS PRN
Qty: 180 ML | Refills: 0 | Status: SHIPPED | OUTPATIENT
Start: 2018-10-29 | End: 2018-11-02

## 2018-10-29 NOTE — PROGRESS NOTES
"Subjective   Joshua Ceja is a 33 y.o. female who presents c/o dry cough x 1 month.     History of Present Illness   At onset had a cold. Now with persistent cough, cough drops, mucinex, robitussin, daily allergy pill, none to settle.   The following portions of the patient's history were reviewed and updated as appropriate: allergies, current medications, past family history, past medical history, past social history, past surgical history and problem list.    Review of Systems   Constitutional: Negative for chills and fever.   HENT: Positive for congestion and sinus pressure. Negative for ear pain and rhinorrhea.    Respiratory: Positive for cough. Negative for shortness of breath.    Neurological: Positive for headache.   Hematological: Negative.    Psychiatric/Behavioral: Negative.      /80   Pulse 106   Temp 98.4 °F (36.9 °C) (Oral)   Ht 170.2 cm (67\")   Wt 119 kg (262 lb)   LMP 10/21/2018 (Approximate)   SpO2 97%   BMI 41.04 kg/m²     Objective   Physical Exam   Constitutional: She appears well-developed and well-nourished.   HENT:   Right Ear: A middle ear effusion is present.   Left Ear: Tympanic membrane normal.   Nose: Nose normal. Right sinus exhibits no maxillary sinus tenderness and no frontal sinus tenderness. Left sinus exhibits no maxillary sinus tenderness and no frontal sinus tenderness.   Mouth/Throat: Uvula is midline, oropharynx is clear and moist and mucous membranes are normal.   Cardiovascular: Normal rate, regular rhythm and normal heart sounds.    Pulmonary/Chest: Effort normal and breath sounds normal.   Skin: Skin is warm and dry.   Psychiatric: She has a normal mood and affect. Her behavior is normal. Judgment and thought content normal.   Nursing note and vitals reviewed.    Assessment/Plan   Problems Addressed this Visit     None      Visit Diagnoses     Post-viral cough syndrome    -  Primary        Continue allergy pill to address.   Reasonable to suppress the " cough. FU if not settling 2 weeks.     IQRA Report:    As part of this patient's treatment plan, I am prescribing controlled substances. The patient has been made aware of appropriate use of such medications, including potential risk of somnolence, limited ability to drive and /or work safely, and potential for dependence or overdose. It has also been made clear that these medications are for use by this patient only, without concomitant use of alcohol or other substances unless prescribed.    IQRA report has been reviewed by: ROXI Melendez on 10/29/18.  The report was scanned into the patient's chart.    Date of last IQRA:  10/29/2018

## 2018-11-01 ENCOUNTER — TELEPHONE (OUTPATIENT)
Dept: FAMILY MEDICINE CLINIC | Facility: CLINIC | Age: 33
End: 2018-11-01

## 2018-11-01 NOTE — TELEPHONE ENCOUNTER
She has tolerated same cough medication well in the past, doubt reaction to medication. Ok to follow up if needs.

## 2018-11-01 NOTE — TELEPHONE ENCOUNTER
Patient says she went to [facility] for chest pain and pain on left side of body. She thought this was a reaction to the cough medication she was recently prescribed. She still has the cough and does not want to use this medication. She wants to know if she should wait a few days for the doses of the medication she did take to run through her body or if she should FU now?

## 2018-11-02 ENCOUNTER — OFFICE VISIT (OUTPATIENT)
Dept: FAMILY MEDICINE CLINIC | Facility: CLINIC | Age: 33
End: 2018-11-02

## 2018-11-02 VITALS
SYSTOLIC BLOOD PRESSURE: 128 MMHG | HEART RATE: 115 BPM | HEIGHT: 67 IN | BODY MASS INDEX: 41.44 KG/M2 | TEMPERATURE: 98.5 F | DIASTOLIC BLOOD PRESSURE: 74 MMHG | WEIGHT: 264 LBS | OXYGEN SATURATION: 100 %

## 2018-11-02 DIAGNOSIS — R00.0 SINUS TACHYCARDIA: ICD-10-CM

## 2018-11-02 DIAGNOSIS — M54.9 ACUTE BILATERAL BACK PAIN, UNSPECIFIED BACK LOCATION: ICD-10-CM

## 2018-11-02 DIAGNOSIS — R00.2 PALPITATIONS: Primary | ICD-10-CM

## 2018-11-02 PROCEDURE — 99213 OFFICE O/P EST LOW 20 MIN: CPT | Performed by: NURSE PRACTITIONER

## 2018-11-02 PROCEDURE — 93000 ELECTROCARDIOGRAM COMPLETE: CPT | Performed by: NURSE PRACTITIONER

## 2018-11-02 RX ORDER — METOPROLOL SUCCINATE 25 MG/1
25 TABLET, EXTENDED RELEASE ORAL DAILY
Qty: 30 TABLET | Refills: 0 | Status: SHIPPED | OUTPATIENT
Start: 2018-11-02 | End: 2019-03-08

## 2018-11-02 NOTE — PROGRESS NOTES
"Halley Ceja is a 33 y.o. female who presents for a follow up on post-viral cough. Went to [facility] on 10/31 for left sided chest pain and weakness. Was advised to not take any more phenergan-codeine.     History of Present Illness   Took 1/2 dose of cough medication day before episode of chest pain, did not do EKG. Still not feeling like self. Feeling weak and pain down sides and back. Felt like this before when pregnant with son, wore heart monitor and was on metoprolol, as heart rate would get elevated when pregnant. Was having palpitations, lips were tingling, and starting to sweat when in immediate care.     Still with cough.   The following portions of the patient's history were reviewed and updated as appropriate: allergies, current medications, past family history, past medical history, past social history, past surgical history and problem list.    Review of Systems   Eyes: Negative.    Respiratory: Positive for cough.    Cardiovascular: Positive for chest pain and palpitations. Negative for leg swelling.   Musculoskeletal: Positive for back pain.   Neurological: Positive for dizziness and weakness. Negative for light-headedness.   Hematological: Negative.    Psychiatric/Behavioral: Negative.      /74   Pulse 115   Temp 98.5 °F (36.9 °C) (Oral)   Ht 170.2 cm (67\")   Wt 120 kg (264 lb)   LMP 10/21/2018 (Approximate)   SpO2 100%   BMI 41.35 kg/m²     Objective   Physical Exam   Constitutional: She appears well-developed and well-nourished.   HENT:   Right Ear: Tympanic membrane normal.   Left Ear: Tympanic membrane normal.   Mouth/Throat: Uvula is midline, oropharynx is clear and moist and mucous membranes are normal.   Neck: Normal range of motion. Neck supple. No thyromegaly present.   Cardiovascular: Regular rhythm, normal heart sounds and normal pulses.  Tachycardia present.    Pulmonary/Chest: Effort normal and breath sounds normal.   Abdominal: Soft. Bowel sounds are normal. "   Musculoskeletal:        Cervical back: Normal.        Thoracic back: She exhibits tenderness. She exhibits normal range of motion, no pain and no spasm.        Lumbar back: She exhibits tenderness. She exhibits normal range of motion and no pain.   Lymphadenopathy:     She has no cervical adenopathy.   Nursing note and vitals reviewed.    Assessment/Plan   Problems Addressed this Visit        Cardiovascular and Mediastinum    Palpitations - Primary    Relevant Orders    ECG 12 Lead      Other Visit Diagnoses     Acute bilateral back pain, unspecified back location        Sinus tachycardia              ECG 12 Lead  Date/Time: 11/2/2018 9:26 AM  Performed by: MIKE BOYD  Authorized by: MIKE BOYD   Comparison: compared with previous ECG from 7/7/2017  Similar to previous ECG  Rhythm: sinus tachycardia  Rate: normal  Conduction: conduction normal  ST Segments: ST segments normal  T depression: aVR and V1  QRS axis: normal and left  Other: no other findings  Clinical impression: normal ECG          ST--previously maintained on metoprolol, reasonable to restart, recheck BP and HR 1 month.   Diffuse back pain--likely secondary to the coughing, would start ibuprofen to address.   Cough--should continue to settle.

## 2018-11-09 ENCOUNTER — TELEPHONE (OUTPATIENT)
Dept: FAMILY MEDICINE CLINIC | Facility: CLINIC | Age: 33
End: 2018-11-09

## 2018-11-09 RX ORDER — METHYLPREDNISOLONE 4 MG/1
TABLET ORAL
Qty: 21 EACH | Refills: 0 | Status: SHIPPED | OUTPATIENT
Start: 2018-11-09 | End: 2019-03-08

## 2018-11-29 RX ORDER — METOPROLOL SUCCINATE 25 MG/1
TABLET, EXTENDED RELEASE ORAL
Qty: 30 TABLET | Refills: 0 | OUTPATIENT
Start: 2018-11-29

## 2018-12-03 RX ORDER — METOPROLOL SUCCINATE 25 MG/1
TABLET, EXTENDED RELEASE ORAL
Qty: 30 TABLET | Refills: 0 | OUTPATIENT
Start: 2018-12-03

## 2018-12-03 NOTE — TELEPHONE ENCOUNTER
Spoke with patient on 11/29, Patient is aware she is due for BP check. She is not out of medication and did not request this refill. Will come by for Bp check before med runs out.

## 2019-01-30 ENCOUNTER — TRANSCRIBE ORDERS (OUTPATIENT)
Dept: PHYSICAL THERAPY | Facility: CLINIC | Age: 34
End: 2019-01-30

## 2019-01-30 DIAGNOSIS — M54.5 LOW BACK PAIN, UNSPECIFIED BACK PAIN LATERALITY, UNSPECIFIED CHRONICITY, WITH SCIATICA PRESENCE UNSPECIFIED: ICD-10-CM

## 2019-01-30 DIAGNOSIS — M54.2 PAIN, NECK: Primary | ICD-10-CM

## 2019-02-04 ENCOUNTER — TREATMENT (OUTPATIENT)
Dept: PHYSICAL THERAPY | Facility: CLINIC | Age: 34
End: 2019-02-04

## 2019-02-04 DIAGNOSIS — M53.3 PAIN OF LEFT SACROILIAC JOINT: ICD-10-CM

## 2019-02-04 DIAGNOSIS — M54.2 CERVICAL PAIN (NECK): ICD-10-CM

## 2019-02-04 DIAGNOSIS — M54.50 ACUTE BILATERAL LOW BACK PAIN WITHOUT SCIATICA: Primary | ICD-10-CM

## 2019-02-04 PROCEDURE — 97161 PT EVAL LOW COMPLEX 20 MIN: CPT | Performed by: PHYSICAL THERAPIST

## 2019-02-04 PROCEDURE — 97014 ELECTRIC STIMULATION THERAPY: CPT | Performed by: PHYSICAL THERAPIST

## 2019-02-04 NOTE — PROGRESS NOTES
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 Initially she felt fine however the next day she had increase low back and neck pain.  She states that the muscle relaxer's and anti inflammatories are not offering any relief and she is in constant pain. Patient states she gets very minimal relief with ice, moist heat or hot showers.  Patient indicates that she has a hard time staying in one position for very long and has to continually move to relief the pain/discomfort.             Patient Occupation: [employer] and Work Restrictions: currently off work. Quality of life: good    Pain  Quality: Aching in neck, burning pain in back, tingling in right UE and LE.  Relieving factors: ice, medications, rest, heat and change in position    Patient Goals  Patient goals for therapy: decreased pain, increased strength and return to work             Objective       Postural Observations  Seated posture: fair  Standing posture: fair  Correction of posture: makes symptoms worse        Palpation   Left   Hypertonic in the erector spinae, lumbar paraspinals and quadratus lumborum.   Tenderness of the erector spinae, lumbar paraspinals and quadratus lumborum.     Right   Hypertonic in the erector spinae, lumbar paraspinals and quadratus lumborum. Tenderness of the erector spinae, lumbar paraspinals and quadratus lumborum.     Active Range of Motion     Additional Active Range of Motion Details  Lumbar AROM %  Flexion 10%  Right Lateral Rotation 10%  Left Lateral Rotation 10%  Right Rotation 10%  Left Rotation 10%            Assessment & Plan     Assessment  Impairments: abnormal or restricted ROM, activity intolerance, lacks appropriate home exercise program and pain with function  Assessment details: Patient is a 33 year old female who  presents with increased low back pain.  Upon evaluation she had increased pain with sitting during duration of eval and had to frequently change positions.  She has moderate tenderness and muscle guarding throughout paraspinals and quadratus.  AROM was minimal in all planes of motion.  We attempted estim and heat however this provided minimal to no relief. She could not tolerate SKTC exercise.   Prognosis: good  Functional Limitations: carrying objects, lifting, walking, pulling, pushing, uncomfortable because of pain, moving in bed, sitting and standing  Goals  Plan Goals: Long Term Goals (6 weeks)    Patient is able to return to work/community activities with minimal to no discomfort  Patient is able to lift 25 lbs from floor to waist  Patient is able stand for as long as needed for work/community related tasks.   Patient is able to sit for as long as needed for work/community related tasks.   Patient is able to reciprocally climb steps as needed for daily tasks.   Patient is able to work overhead as needed for work/community activities.   Patient is able to perform patient transfers for return to work.       Short Term Goals (3 weeks)    Patient is independent with HEP  Patient has full AROM/PROM of lumbar spine  Patient has greater than 50% improvement overall.               Plan  Therapy options: will be seen for skilled physical therapy services  Planned modality interventions: TENS, ultrasound, thermotherapy (hydrocollator packs) and cryotherapy  Planned therapy interventions: manual therapy, soft tissue mobilization, strengthening, stretching, therapeutic activities, joint mobilization, home exercise program, functional ROM exercises, flexibility and body mechanics training  Frequency: 3x week  Duration in weeks: 8  Treatment plan discussed with: patient          Therapeutic Exercise:    3     mins  16256;       Timed Treatment:   3   mins   Total Treatment:     35   mins    PT SIGNATURE: Lian PARSON  Raymundo, TAL   DATE TREATMENT INITIATED: 2/4/2019    Initial Certification  Certification Period: 5/5/2019  I certify that the therapy services are furnished while this patient is under my care.  The services outlined above are required by this patient, and will be reviewed every 90 days.     PHYSICIAN: Paty Mckeon PA      DATE:     Please sign and return via fax to [phone].. Thank you, [facility].

## 2019-02-18 ENCOUNTER — TRANSCRIBE ORDERS (OUTPATIENT)
Dept: PHYSICAL THERAPY | Facility: CLINIC | Age: 34
End: 2019-02-18

## 2019-02-18 DIAGNOSIS — M54.5 LOW BACK PAIN, UNSPECIFIED BACK PAIN LATERALITY, UNSPECIFIED CHRONICITY, WITH SCIATICA PRESENCE UNSPECIFIED: Primary | ICD-10-CM

## 2019-02-27 ENCOUNTER — TREATMENT (OUTPATIENT)
Dept: PHYSICAL THERAPY | Facility: CLINIC | Age: 34
End: 2019-02-27

## 2019-02-27 DIAGNOSIS — M54.2 CERVICAL PAIN (NECK): ICD-10-CM

## 2019-02-27 DIAGNOSIS — M53.3 PAIN OF LEFT SACROILIAC JOINT: ICD-10-CM

## 2019-02-27 DIAGNOSIS — M54.50 ACUTE BILATERAL LOW BACK PAIN WITHOUT SCIATICA: Primary | ICD-10-CM

## 2019-02-27 PROCEDURE — 97110 THERAPEUTIC EXERCISES: CPT | Performed by: PHYSICAL THERAPIST

## 2019-02-27 NOTE — PROGRESS NOTES
Physical Therapy Daily Progress Note      Visit # 2      Subjective Evaluation    History of Present Illness    Subjective comment: Patient reports that she is feeling better than her previous appointment.  She states that she feels liek time has helped more than anything.  Comments that she still cannot lay on her back but is able to lay on her side.        Objective   See Exercise, Manual, and Modality Logs for complete treatment.       Assessment & Plan     Assessment  Assessment details: Patient tolerated treatment much better than previous visit.  Performed all exercises in sitting today.  Comments that her neck is bothering her a lot more now than her low back.  Overall improving but still functionally limited with daily activities.     Plan  Plan details: Progress as tolerated.                        Therapeutic Exercise:    20     mins  46285;       Timed Treatment:   20   mins   Total Treatment:     30   mins    Lian Fonseca, PT  Physical Therapist

## 2019-02-28 ENCOUNTER — TREATMENT (OUTPATIENT)
Dept: PHYSICAL THERAPY | Facility: CLINIC | Age: 34
End: 2019-02-28

## 2019-02-28 DIAGNOSIS — M53.3 PAIN OF LEFT SACROILIAC JOINT: ICD-10-CM

## 2019-02-28 DIAGNOSIS — M54.2 CERVICAL PAIN (NECK): ICD-10-CM

## 2019-02-28 DIAGNOSIS — M54.50 ACUTE BILATERAL LOW BACK PAIN WITHOUT SCIATICA: Primary | ICD-10-CM

## 2019-02-28 PROCEDURE — 97110 THERAPEUTIC EXERCISES: CPT | Performed by: PHYSICAL THERAPIST

## 2019-02-28 NOTE — PROGRESS NOTES
"Physical Therapy Daily Progress Note    Visit #3    Joshua Ceja reports: \"I'm feeling a little better.  My neck still bothers me more than my low back most of the time.  It feels really tight and sore into my shoulders on both sides too.\"    Subjective Evaluation    Pain  Pain scale: 6/10 cervical, 3-4/10 lumbar.           Objective   See Exercise, Manual, and Modality Logs for complete treatment.   *Initiated LS stretches and sidelying thoracic rotation    Assessment & Plan     Assessment  Assessment details: Patient verbalizes (R) cervical and upper trap discomfort with performance of upper trap stretch (sidebending left) despite cueing for tolerable stretch.  She expresses increasing soreness with repetitions of thoracic rotation.  Educated patient on neutral cervical position during this activity.  She responds positively to moist heat application following session this date.        Progress per Plan of Care with emphasis on soft tissue flexibility.         Manual Therapy:         mins  04659;  Therapeutic Exercise:    26     mins  19444;     Neuromuscular Zhang:        mins  19564;    Therapeutic Activity:          mins  46482;     Gait Training:           mins  73373;     Ultrasound:          mins  14093;    Electrical Stimulation:         mins  19403 ( );  Dry Needling          mins self-pay    Timed Treatment:   26   mins   Total Treatment:     40   mins    Janelle Gaona PT, DPT  Physical Therapist  [place] License # [id number]    "

## 2019-03-06 ENCOUNTER — TREATMENT (OUTPATIENT)
Dept: PHYSICAL THERAPY | Facility: CLINIC | Age: 34
End: 2019-03-06

## 2019-03-06 DIAGNOSIS — M54.2 CERVICAL PAIN (NECK): ICD-10-CM

## 2019-03-06 DIAGNOSIS — M54.50 ACUTE BILATERAL LOW BACK PAIN WITHOUT SCIATICA: Primary | ICD-10-CM

## 2019-03-06 PROCEDURE — 97110 THERAPEUTIC EXERCISES: CPT | Performed by: PHYSICAL THERAPIST

## 2019-03-06 NOTE — PROGRESS NOTES
"Visit #4    Physical Therapy Daily Progress Note    Joshua Ceja reports: \"Back and neck feel the same as they did last week.  The therapy is helping a little bit because I'm not in as much pain as I was before, but it still hurts quite a bit.\"    Subjective     Objective   See Exercise, Manual, and Modality Logs for complete treatment.   *Initiated supine chin tucks and supine cervical rotation    Assessment & Plan     Assessment  Assessment details: Patient cites appointment confusion as reason for missing Monday appointment.  She verbalizes persistent cervical > lumbar symptoms and her tolerance to exercise progression is poor.  (R) cervical symptoms are provoked by right upper trap stretch and thoracic rotation and (L) hip/lumbar region is aggravated by left sidelying position which is able to be modified with cueing to slightly alter body position.  She declines modalities this date due to having her children with her.        Progress per Plan of Care. Reassess cervical and lumbar AROM.         Manual Therapy:         mins  83922;  Therapeutic Exercise:    24     mins  89880;     Neuromuscular Zhang:        mins  44222;    Therapeutic Activity:          mins  01512;     Gait Training:           mins  20857;     Ultrasound:          mins  53178;    Electrical Stimulation:         mins  70908 ( );  Dry Needling          mins self-pay    Timed Treatment:   24   mins   Total Treatment:     26   mins    Janelle Gaona PT, DPT  Physical Therapist  [place] License # [id number]    "

## 2019-03-08 ENCOUNTER — TREATMENT (OUTPATIENT)
Dept: PHYSICAL THERAPY | Facility: CLINIC | Age: 34
End: 2019-03-08

## 2019-03-08 ENCOUNTER — OFFICE VISIT (OUTPATIENT)
Dept: FAMILY MEDICINE CLINIC | Facility: CLINIC | Age: 34
End: 2019-03-08

## 2019-03-08 VITALS
TEMPERATURE: 99.3 F | SYSTOLIC BLOOD PRESSURE: 100 MMHG | HEART RATE: 113 BPM | OXYGEN SATURATION: 99 % | DIASTOLIC BLOOD PRESSURE: 76 MMHG | WEIGHT: 272 LBS | HEIGHT: 67 IN | BODY MASS INDEX: 42.69 KG/M2

## 2019-03-08 DIAGNOSIS — R35.0 FREQUENT URINATION: Primary | ICD-10-CM

## 2019-03-08 DIAGNOSIS — M54.50 ACUTE BILATERAL LOW BACK PAIN WITHOUT SCIATICA: Primary | ICD-10-CM

## 2019-03-08 DIAGNOSIS — E55.9 VITAMIN D DEFICIENCY: ICD-10-CM

## 2019-03-08 DIAGNOSIS — E66.01 MORBID (SEVERE) OBESITY DUE TO EXCESS CALORIES (HCC): ICD-10-CM

## 2019-03-08 DIAGNOSIS — R80.9 PROTEINURIA, UNSPECIFIED TYPE: ICD-10-CM

## 2019-03-08 DIAGNOSIS — M54.2 CERVICAL PAIN (NECK): ICD-10-CM

## 2019-03-08 DIAGNOSIS — Z13.220 LIPID SCREENING: ICD-10-CM

## 2019-03-08 DIAGNOSIS — M53.3 PAIN OF LEFT SACROILIAC JOINT: ICD-10-CM

## 2019-03-08 PROBLEM — R00.2 PALPITATIONS: Status: RESOLVED | Noted: 2017-07-10 | Resolved: 2019-03-08

## 2019-03-08 LAB
BILIRUB BLD-MCNC: NEGATIVE MG/DL
CLARITY, POC: CLEAR
COLOR UR: YELLOW
GLUCOSE UR STRIP-MCNC: NEGATIVE MG/DL
KETONES UR QL: ABNORMAL
LEUKOCYTE EST, POC: NEGATIVE
NITRITE UR-MCNC: NEGATIVE MG/ML
PH UR: 8.5 [PH] (ref 5–8)
PROT UR STRIP-MCNC: ABNORMAL MG/DL
RBC # UR STRIP: ABNORMAL /UL
SP GR UR: 1.02 (ref 1–1.03)
UROBILINOGEN UR QL: ABNORMAL

## 2019-03-08 PROCEDURE — 99214 OFFICE O/P EST MOD 30 MIN: CPT | Performed by: NURSE PRACTITIONER

## 2019-03-08 PROCEDURE — 81002 URINALYSIS NONAUTO W/O SCOPE: CPT | Performed by: NURSE PRACTITIONER

## 2019-03-08 PROCEDURE — 97110 THERAPEUTIC EXERCISES: CPT | Performed by: PHYSICAL THERAPIST

## 2019-03-08 PROCEDURE — 97035 APP MDLTY 1+ULTRASOUND EA 15: CPT | Performed by: PHYSICAL THERAPIST

## 2019-03-08 RX ORDER — MELOXICAM 15 MG/1
15 TABLET ORAL DAILY PRN
Refills: 2 | COMMUNITY
Start: 2019-02-20 | End: 2019-11-01

## 2019-03-08 NOTE — PROGRESS NOTES
"Halley Ceja is a 33 y.o. female who presents to us today for frequent and painful urination and annual exam (no pap).     History of Present Illness   Symptoms include pain with finishing voiding x 1 week. Has seen a little bit of blood with wiping.   Had MRI done on back told cyst at [facility] 1 week ago. Had UTI at the time. Did call gyn, was told not bad, would probably pop on own.     The following portions of the patient's history were reviewed and updated as appropriate: allergies, current medications, past family history, past medical history, past social history, past surgical history and problem list.    Review of Systems   Constitutional: Negative for activity change, appetite change, chills, fatigue, fever, unexpected weight gain and unexpected weight loss.   Respiratory: Negative.  Negative for shortness of breath.    Cardiovascular: Negative.  Negative for chest pain, palpitations (resolved, thinks perhaps anxiety) and leg swelling.   Genitourinary: Positive for dysuria and frequency. Negative for urinary incontinence, difficulty urinating, flank pain, menstrual problem (LMP 1 week ago) and urgency.   Psychiatric/Behavioral: Negative.      /76 (BP Location: Right arm, Patient Position: Sitting, Cuff Size: Large Adult)   Pulse 113   Temp 99.3 °F (37.4 °C) (Oral)   Ht 170.2 cm (67\")   Wt 123 kg (272 lb)   LMP 02/27/2019   SpO2 99%   BMI 42.60 kg/m²     Objective   Physical Exam   Constitutional: She appears well-developed and well-nourished. No distress.   Neck: Normal range of motion. Neck supple. No thyromegaly present.   Cardiovascular: Normal rate, regular rhythm and normal heart sounds.   Pulmonary/Chest: Effort normal and breath sounds normal.   Abdominal: Soft. There is tenderness. There is CVA tenderness.   Musculoskeletal:        Cervical back: She exhibits decreased range of motion (R>L).        Lumbar back: She exhibits tenderness (shine flank). She exhibits no bony " tenderness, no pain and no spasm.   Lymphadenopathy:     She has no cervical adenopathy.   Skin: Skin is warm and dry.   Psychiatric: She has a normal mood and affect. Her speech is normal and behavior is normal. Judgment and thought content normal.   Nursing note and vitals reviewed.    Assessment/Plan   Problems Addressed this Visit        Digestive    Morbid (severe) obesity due to excess calories (CMS/[facility])    Relevant Orders    TSH    Vitamin D deficiency    Relevant Orders    Vitamin D 25 Hydroxy      Other Visit Diagnoses     Frequent urination    -  Primary    Relevant Orders    POC Urinalysis Dipstick (Completed)    Urine Culture - Urine, Urine, Clean Catch    Proteinuria, unspecified type        Relevant Orders    Comprehensive Metabolic Panel    Lipid screening        Relevant Orders    Lipid Panel        Results for orders placed or performed in visit on 03/08/19   POC Urinalysis Dipstick   Result Value Ref Range    Color Yellow Yellow, Straw, Dark Yellow, Sarahi    Clarity, UA Clear Clear    Glucose, UA Negative Negative, 1000 mg/dL (3+) mg/dL    Bilirubin Negative Negative    Ketones, UA Trace (A) Negative    Specific Gravity  1.025 1.005 - 1.030    Blood, UA Trace (A) Negative    pH, Urine 8.5 (A) 5.0 - 8.0    Protein,  mg/dL (A) Negative mg/dL    Urobilinogen, UA 4 E.U./dL  (A) Normal    Leukocytes Negative Negative    Nitrite, UA Negative Negative     Obesity--check TSH  Vit D def--continue replacement--check levels for toxicity  Urinary symptoms and proteinuria--check CMP and culture, no true UTI, consider renal US. BP well controlled  Lipid screening  FU annually, sooner, if symptoms not settling

## 2019-03-08 NOTE — PROGRESS NOTES
"Physical Therapy Daily Progress Note    Joshua Ceja reports: \"My neck and shoulder/upper back on the right feels about the same.  I saw Dr. Alaniz yesterday.  She kept my restrictions the same and wants me to keep doing the therapy [6 more visits]. 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    Subjective     Objective       Active Range of Motion   Cervical/Thoracic Spine   Cervical    Flexion: 27 degrees   Extension: 19 degrees with pain  Left lateral flexion: 29 degrees with pain  Right lateral flexion: 23 degrees with pain  Left rotation: 70 degrees with pain  Right rotation: 65 degrees with pain    Additional Active Range of Motion Details  Lumbar AROM (%):  Flexion 75% with tightness  Left sidebend 75% with pain  Right sidebend 75% with pain  Left rotation 50% with pain  Right rotation 50% with pain     See Exercise, Manual, and Modality Logs for complete treatment.   *Initiated ultrasound treatment    Assessment & Plan     Assessment  Assessment details: Discussed strategies to avoid prolonged cervical and upper thoracic flexion during light duty work.  Discussed setting timer on phone to take walk breaks, varying between sitting and standing, and alternative foot placements to decrease areas of stress and also bring patient closer to table/silverware (wide stance, staggered stance, and alternate foot prop on stool).  Patient demonstrates good improvements in lumbar AROM all planes assessed this date compared to initial evaluation.        Progress per Plan of Care as s/s allow.         Manual Therapy:         mins  21621;  Therapeutic Exercise:    31     mins  55890;     Neuromuscular Zhang:        mins  04598;    Therapeutic Activity:          mins  41347;     Gait Training:    "        mins  45201;     Ultrasound:     8     mins  77881;    Electrical Stimulation:         mins  09504 ( );  Dry Needling          mins self-pay    Timed Treatment:   39   mins   Total Treatment:     41   mins    Janelle Gaona PT, DPT  Physical Therapist  [place] License # [id number]

## 2019-03-11 ENCOUNTER — TREATMENT (OUTPATIENT)
Dept: PHYSICAL THERAPY | Facility: CLINIC | Age: 34
End: 2019-03-11

## 2019-03-11 DIAGNOSIS — M54.2 CERVICAL PAIN (NECK): ICD-10-CM

## 2019-03-11 DIAGNOSIS — M54.50 ACUTE BILATERAL LOW BACK PAIN WITHOUT SCIATICA: Primary | ICD-10-CM

## 2019-03-11 PROCEDURE — 97110 THERAPEUTIC EXERCISES: CPT | Performed by: PHYSICAL THERAPIST

## 2019-03-11 NOTE — PROGRESS NOTES
Physical Therapy Daily Progress Note      Visit # 6      Subjective Evaluation    History of Present Illness    Subjective comment: Patient reports that her back continues to feel better but her neck is still bothersome.  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  She is trying to switch up tasks to alleviate discomfort.        Objective   See Exercise, Manual, and Modality Logs for complete treatment.       Assessment & Plan     Assessment  Assessment details: Patient tolerated treatment fairly well.  Was able to tolerated progression of exercises with minimal increase in pain.  Attempted manual therapy to cervical spine however did not tolerate well.  She continues to have moderate to severe tenderness/trigger points along superior and medial aspect of scapula.  Suggested that she work on stretches as much as she can throughout her shift.       Plan  Plan details: Progress as tolerated.                      Therapeutic Exercise:    40     mins  32909;       Timed Treatment:   40   mins   Total Treatment:     40   mins    Lian Fonseca, PT  Physical Therapist

## 2019-03-13 LAB
BACTERIA UR CULT: NORMAL
BACTERIA UR CULT: NORMAL

## 2019-03-14 ENCOUNTER — TREATMENT (OUTPATIENT)
Dept: PHYSICAL THERAPY | Facility: CLINIC | Age: 34
End: 2019-03-14

## 2019-03-14 DIAGNOSIS — M54.50 ACUTE BILATERAL LOW BACK PAIN WITHOUT SCIATICA: Primary | ICD-10-CM

## 2019-03-14 DIAGNOSIS — M54.2 CERVICAL PAIN (NECK): ICD-10-CM

## 2019-03-14 PROCEDURE — 97014 ELECTRIC STIMULATION THERAPY: CPT | Performed by: PHYSICAL THERAPIST

## 2019-03-14 PROCEDURE — 97110 THERAPEUTIC EXERCISES: CPT | Performed by: PHYSICAL THERAPIST

## 2019-03-14 NOTE — PROGRESS NOTES
"Physical Therapy Daily Progress Note    Joshua Ceja reports: \"I finally got work to let me pass toure and do some different activities like check vitals and I did carry and empty oxygen tank, so I was moving more and more active, but when I got home yesterday it felt like I had pulled a muscle in my upper back. It's really sore.  My low back still feels ok.\"    Subjective Evaluation    Pain  Current pain ratin  Location: upper thoracic, (R) upper trap           Objective   See Exercise, Manual, and Modality Logs for complete treatment.   *Increased hold time with wall slides  *Initiated estim treatment for upper thoracic region    Assessment & Plan     Assessment  Assessment details: Patient reports pain and soreness in central to (B) upper thoracic region which began yesterday following variation of light duty work tasks, specifically passing toure, checking vitals, and carrying empty oxygen tank.  She is unsure of specific provocation as pain onset was not until arriving home after work day end.  She reports pain with several activities performed this date despite cueing for pain free range of motion, though thoracic rotation complaints are steadily decreasing and range of motion arc increasing.  She responds mildly positively to IFC estim and heat application to the area this date.        Other - progress per POC.  Patient RTMD 19.         Manual Therapy:         mins  02699;  Therapeutic Exercise:    39     mins  14966;     Neuromuscular Zhang:        mins  34379;    Therapeutic Activity:          mins  39851;     Gait Training:           mins  38137;     Ultrasound:          mins  37594;    Electrical Stimulation:    15     mins  90216 ( );  Dry Needling          mins self-pay    Timed Treatment:   39   mins   Total Treatment:     57   mins    Janelle Gaona PT, DPT  Physical Therapist  [place] License # [id number]    "

## 2019-03-15 ENCOUNTER — TREATMENT (OUTPATIENT)
Dept: PHYSICAL THERAPY | Facility: CLINIC | Age: 34
End: 2019-03-15

## 2019-03-15 DIAGNOSIS — M54.50 ACUTE BILATERAL LOW BACK PAIN WITHOUT SCIATICA: Primary | ICD-10-CM

## 2019-03-15 DIAGNOSIS — M54.2 CERVICAL PAIN (NECK): ICD-10-CM

## 2019-03-15 PROCEDURE — 97014 ELECTRIC STIMULATION THERAPY: CPT | Performed by: PHYSICAL THERAPIST

## 2019-03-15 PROCEDURE — 97110 THERAPEUTIC EXERCISES: CPT | Performed by: PHYSICAL THERAPIST

## 2019-03-15 NOTE — PROGRESS NOTES
"Physical Therapy Daily Progress Note    Joshua Ceja reports: \"To be honest, that machine [electrical stimulation] helped a lot yesterday.  I'm still sore but it feels a lot better than it did yesterday.  My low back isn't bothering me. This coming Monday will be my first 12-hour shift since January.\"    Subjective     Objective   See Exercise, Manual, and Modality Logs for complete treatment.       Assessment & Plan     Assessment  Assessment details: Patient reports positive response and moderately decreased symptoms following initial electrical stimulation treatment yesterday.  She demonstrates mild increases per visual inspection in range of motion arcs during stretching/ROM activities performed this date.  She expresses concern regarding first 12 hour shift this upcoming Monday despite light duty restrictions still in place.        Other - reassess for MD.         Manual Therapy:         mins  31527;  Therapeutic Exercise:    31     mins  09907;     Neuromuscular Zhang:        mins  60008;    Therapeutic Activity:          mins  75993;     Gait Training:           mins  56779;     Ultrasound:          mins  38698;    Electrical Stimulation:    15     mins  88500 ( );  Dry Needling          mins self-pay    Timed Treatment:   31   mins   Total Treatment:     48   mins    Janelle Gaona PT, DPT  Physical Therapist  [place] License # [id number]    "

## 2019-03-19 ENCOUNTER — TREATMENT (OUTPATIENT)
Dept: PHYSICAL THERAPY | Facility: CLINIC | Age: 34
End: 2019-03-19

## 2019-03-19 DIAGNOSIS — M54.2 CERVICAL PAIN (NECK): ICD-10-CM

## 2019-03-19 DIAGNOSIS — M54.50 ACUTE BILATERAL LOW BACK PAIN WITHOUT SCIATICA: Primary | ICD-10-CM

## 2019-03-19 PROCEDURE — 97014 ELECTRIC STIMULATION THERAPY: CPT | Performed by: PHYSICAL THERAPIST

## 2019-03-19 PROCEDURE — 97110 THERAPEUTIC EXERCISES: CPT | Performed by: PHYSICAL THERAPIST

## 2019-03-19 NOTE — PROGRESS NOTES
Physical Therapy Daily Progress Note      Visit # 9      Subjective Evaluation    History of Present Illness    Subjective comment: Patient reports that her neck feels swollen and puffy since her return to her 12 hours shifts.  She states that the activiites have not changed only the duration of the shift.        Objective   See Exercise, Manual, and Modality Logs for complete treatment.       Assessment & Plan     Assessment  Assessment details: Patient has increased tightness along scalenes and levator/upper trap bilaterally.  Patient states she has increased pain on the right however left appears more guarded. Suggested she attempt to stretch as much as she can throughout her shift, specifically with the longer shifts. Patient reports relief with use of estim and heat.     Plan  Plan details: Progress as tolerated.                        Therapeutic Exercise:    35     mins  36526;     Timed Treatment:   35   mins   Total Treatment:     50   mins    Lian Fonseca, PT  Physical Therapist

## 2019-03-20 ENCOUNTER — TREATMENT (OUTPATIENT)
Dept: PHYSICAL THERAPY | Facility: CLINIC | Age: 34
End: 2019-03-20

## 2019-03-20 ENCOUNTER — TELEPHONE (OUTPATIENT)
Dept: FAMILY MEDICINE CLINIC | Facility: CLINIC | Age: 34
End: 2019-03-20

## 2019-03-20 DIAGNOSIS — M54.50 ACUTE BILATERAL LOW BACK PAIN WITHOUT SCIATICA: Primary | ICD-10-CM

## 2019-03-20 DIAGNOSIS — M54.2 CERVICAL PAIN (NECK): ICD-10-CM

## 2019-03-20 PROCEDURE — 97530 THERAPEUTIC ACTIVITIES: CPT | Performed by: PHYSICAL THERAPIST

## 2019-03-20 PROCEDURE — 97014 ELECTRIC STIMULATION THERAPY: CPT | Performed by: PHYSICAL THERAPIST

## 2019-03-20 PROCEDURE — 97110 THERAPEUTIC EXERCISES: CPT | Performed by: PHYSICAL THERAPIST

## 2019-03-20 NOTE — TELEPHONE ENCOUNTER
Patient states she is still having pain with urination and blood in urine. All testing was normal at last visit per pt. She wants to know what to do from here.

## 2019-03-20 NOTE — PROGRESS NOTES
"Physical Therapy Progress Note    3/20/2019  Yael Alaniz MD    Re: Joshua Ceja  ________________________________________________________________    Ms. Joshua Ceja, has attended 9 PT sessions.  Treatment has consisted of: patient education, therapeutic activity, therapeutic exercise, and modalities.    S: Ms. Joshua Ceja states: \"I'm really swollen in both [upper traps] since doing the 12 hour shifts this week.  My neck seems to be at a standstill; it's better than when we first started, but I'm always in pain.  My low back/left hip area doesn't bother me unless I lay on it.\"      Subjective Evaluation    Pain  Current pain ratin (0/10 lumbar/left SI region)  Location: (B) cervical, upper traps           Objective       Tenderness     Additional Tenderness Details  Mod (+) TTP C4-T6 SPs and paraspinals, (B) UTs  Min (+) TTP L5 SP, sacral base, (L) PSIS    Active Range of Motion   Cervical/Thoracic Spine   Cervical    Flexion: 43 (\"a little pain\") degrees   Extension: 48 degrees with pain  Left lateral flexion: 44 degrees   Right lateral flexion: 46 degrees   Left rotation: 70 (stretching) degrees   Right rotation: 68 (\"stretching\") degrees     Additional Active Range of Motion Details  Lumbar AROM (%):  Flexion 90%  Extension 50% with (L) SI pain  Left sidebend 100%  Right sidebend 100%  Left rotation 50% (L) SI pain  Right rotation 75%     See Exercise, Manual, and Modality Logs for complete treatment.       Assessment & Plan     Assessment  Assessment details: Patient has been compliant and cooperative with PT efforts.  She demonstrates good improvements in lumbar/left SI c/o and mobility but minimal progress relating to cervical and upper thoracic c/o.  She reports persistent, constant, daily limiting pain in the cervical and upper thoracic regions bilaterally. She experiences mild symptom relief with modalities but lacks evidence of significant functional improvement.  She has " recently transitioned to working full 12-hour work shifts doing light duty tasks including checking vitals, passing water, and doing feedings. Please advise after your exam.        Other - patient RTMD tomorrow, 03/21/19.         Manual Therapy:         mins  36322;  Therapeutic Exercise:    31     mins  77398;     Neuromuscular Zhang:        mins  06499;    Therapeutic Activity:     10     mins  64516;     Gait Training:           mins  32513;     Ultrasound:          mins  68555;    Electrical Stimulation:    15     mins  69570 ( );  Dry Needling          mins self-pay    Timed Treatment:   41   mins   Total Treatment:     66   mins    Janelle Gaona PT, DPT  Physical Therapist  [place] License # [id number]

## 2019-03-27 ENCOUNTER — TREATMENT (OUTPATIENT)
Dept: PHYSICAL THERAPY | Facility: CLINIC | Age: 34
End: 2019-03-27

## 2019-03-27 DIAGNOSIS — M54.50 ACUTE BILATERAL LOW BACK PAIN WITHOUT SCIATICA: Primary | ICD-10-CM

## 2019-03-27 DIAGNOSIS — M54.2 CERVICAL PAIN (NECK): ICD-10-CM

## 2019-03-27 PROCEDURE — 97110 THERAPEUTIC EXERCISES: CPT | Performed by: PHYSICAL THERAPIST

## 2019-03-27 PROCEDURE — 97530 THERAPEUTIC ACTIVITIES: CPT | Performed by: PHYSICAL THERAPIST

## 2019-03-27 PROCEDURE — 97014 ELECTRIC STIMULATION THERAPY: CPT | Performed by: PHYSICAL THERAPIST

## 2019-03-27 NOTE — PROGRESS NOTES
"Physical Therapy Daily Progress Note    Joshua Ceja reports: \"I saw Dr. Alaniz and told her I am not feeling much better.  She said I am about 65-70% improved from the beginning and wants me to do more therapy.  I am going to be gone all next week though so I will only get in 2-3 visits this week before I see her again on April 8.  She gave me 3 steroid injections [(B) upper traps, thoracic region] and they haven't helped at all.\"    Subjective     Objective   See Exercise, Manual, and Modality Logs for complete treatment.   *Transitioned to heavier emphasis on strengthening activities  *Initiated resisted ER vs band, LPD vs band, and partial squats  *Initiated simulated patient transfers (weighted sheet at 50#)    Assessment & Plan     Assessment  Assessment details: Educated patient regarding proper body mechanics with squatting activities to include abdominal bracing, keeping back straight, drawing power from LEs for lifting, and keeping load as close to core/COM as possible.  Discussed wide stance during HOB transfers to allow lateral weightshifting each direction and staggered stance during med/lat transfers to allow keeping COM as close to load as possible.  Patient verbalizes pain with squatting and simulated patient care activities this date but the latter symptoms did decrease when proper body mechanics and safe transfer techniques were utilized.        Progress strengthening /stabilization /functional activity with emphasis on progression toward simulated work tasks/patient transfers.         Manual Therapy:         mins  46793;  Therapeutic Exercise:    31     mins  31168;     Neuromuscular Zhang:        mins  53085;    Therapeutic Activity:     15     mins  84119;     Gait Training:           mins  36410;     Ultrasound:          mins  31458;    Electrical Stimulation:    15     mins  11260 ( );  Dry Needling          mins self-pay    Timed Treatment:   46   mins   Total Treatment:    69   " jayne Gaona, PT, DPT  Physical Therapist  [place] License # [id number]

## 2019-04-09 ENCOUNTER — TREATMENT (OUTPATIENT)
Dept: PHYSICAL THERAPY | Facility: CLINIC | Age: 34
End: 2019-04-09

## 2019-04-09 DIAGNOSIS — M54.2 CERVICAL PAIN (NECK): ICD-10-CM

## 2019-04-09 DIAGNOSIS — M54.50 ACUTE BILATERAL LOW BACK PAIN WITHOUT SCIATICA: Primary | ICD-10-CM

## 2019-04-09 PROCEDURE — 97110 THERAPEUTIC EXERCISES: CPT | Performed by: PHYSICAL THERAPIST

## 2019-04-09 PROCEDURE — 97530 THERAPEUTIC ACTIVITIES: CPT | Performed by: PHYSICAL THERAPIST

## 2019-04-09 NOTE — PROGRESS NOTES
"Physical Therapy Daily Progress Note    Visit #12    Joshua Ceja reports: \"I am feeling about the same.  I was so sore after last therapy visit when we did more strengthening and started some lifting. I pushed my doctor appointment with Dr. Alaniz back to next Monday (April 15) but today is my last approved PT session.  I haven't been working because work said after  if I was still on restrictions I had to be off until I was released.\"     Subjective Evaluation    Pain  Current pain ratin  Location: (B) cervical paraspinals, (B) UTs           Objective   See Exercise, Manual, and Modality Logs for complete treatment.   *Initiated stand pivot tsf and rolling patient in bed    Assessment & Plan     Assessment  Assessment details: Patient reports no significant improvements in cervical and (B) UT symptoms despite being off work since the end of March. She benefits from verbal cueing to avoid compensatory upper trap substitution during resisted row and LPD activities. Patient demonstrates improved ability to perform simulated patient transfers and care activities with less discomfort.  She benefits from verbal cueing for  reminders as well as abdominal bracing throughout simulated work activities.  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        Awaiting MD orders; patient to f/u with Dr. Alaniz on Monday, April 15.         Manual Therapy:         mins  23978;  Therapeutic Exercise:    31     mins  24599;     Neuromuscular Zhang:        mins  29203;    Therapeutic Activity:     14     mins  93597;     Gait Training:           mins  71317;     Ultrasound:          mins  60864;    Electrical Stimulation:             mins  03474 ( );  Dry Needling          mins self-pay    Timed Treatment:   " 45   mins   Total Treatment:     45   mins    Janelle Gaona PT, DPT  Physical Therapist  [place] License # [id number]

## 2019-11-01 ENCOUNTER — OFFICE VISIT (OUTPATIENT)
Dept: FAMILY MEDICINE CLINIC | Facility: CLINIC | Age: 34
End: 2019-11-01

## 2019-11-01 VITALS
OXYGEN SATURATION: 98 % | WEIGHT: 270 LBS | TEMPERATURE: 98.5 F | HEART RATE: 106 BPM | DIASTOLIC BLOOD PRESSURE: 82 MMHG | SYSTOLIC BLOOD PRESSURE: 128 MMHG | HEIGHT: 67 IN | BODY MASS INDEX: 42.38 KG/M2

## 2019-11-01 DIAGNOSIS — J01.10 ACUTE NON-RECURRENT FRONTAL SINUSITIS: ICD-10-CM

## 2019-11-01 DIAGNOSIS — M54.12 CERVICAL RADICULAR PAIN: Primary | ICD-10-CM

## 2019-11-01 PROCEDURE — 99213 OFFICE O/P EST LOW 20 MIN: CPT | Performed by: NURSE PRACTITIONER

## 2019-11-01 RX ORDER — METHOCARBAMOL 500 MG/1
500 TABLET, FILM COATED ORAL 4 TIMES DAILY
Qty: 120 TABLET | Refills: 0 | Status: SHIPPED | OUTPATIENT
Start: 2019-11-01

## 2019-11-01 RX ORDER — METHYLPREDNISOLONE 4 MG/1
TABLET ORAL
COMMUNITY
Start: 2019-10-30

## 2019-11-01 RX ORDER — HYDROCODONE BITARTRATE AND ACETAMINOPHEN 5; 325 MG/1; MG/1
1 TABLET ORAL
COMMUNITY
Start: 2019-10-30

## 2019-11-01 RX ORDER — CYCLOBENZAPRINE HCL 5 MG
5 TABLET ORAL
COMMUNITY
Start: 2019-10-30 | End: 2019-11-01

## 2019-11-01 RX ORDER — AMOXICILLIN 500 MG/1
1000 CAPSULE ORAL 2 TIMES DAILY
Qty: 28 CAPSULE | Refills: 0 | Status: SHIPPED | OUTPATIENT
Start: 2019-11-01

## 2019-11-01 NOTE — PROGRESS NOTES
Subjective   Joshua Ceja is a 34 y.o. female who presents for a follow up on neck pain. Was treated in ER on 10/30 and given flexeril. Also with a dry cough.     History of Present Illness   Neck pain onset 10/30/19, not getting any better, though has been alternating HC and flexeril. They are making sleepy, but not getting any better. Neck feels heavy. Right arm feels heavy. Hurt back in January and had some neck pain then, but never like this. Had primarily low back pain then.Just woke up this way, no remembered injury, just couldn't get out of bed. Arm is numb in fingers only. Upper arm on the top feels sore and heavy. Sharp shooting pain down the back of the cervical spine. Has been applying ice and heat, has been doing gentle ROM stretches and stretches given when in PT in Jan-Feb. Has been taking steroids as well. Has never taken different muscle relaxers. Had trigger point injections when in therapy before. Saw Yael Alaniz.  Gets same cough every year. Onset was about 1 month ago, tried mucinex and cough drops, thinks needs allergy testing. No asthma as a child, no reflux or indigestion.   The following portions of the patient's history were reviewed and updated as appropriate: allergies, current medications, past family history, past medical history, past social history, past surgical history and problem list.    Review of Systems   HENT: Positive for congestion, ear pain (R) and sinus pressure. Negative for postnasal drip, rhinorrhea and sore throat (scratchy).    Eyes: Negative.    Respiratory: Positive for cough and wheezing (does wheeze at times).    Cardiovascular: Negative.    Gastrointestinal: Negative.    Musculoskeletal: Positive for myalgias and neck pain. Negative for gait problem.   Neurological: Positive for weakness, numbness and headache (Frontal doesn't like to take medications). Negative for confusion.   Hematological: Negative.    Psychiatric/Behavioral: Negative.      /82    "Pulse 106   Temp 98.5 °F (36.9 °C) (Oral)   Ht 170.2 cm (67\")   Wt 122 kg (270 lb)   SpO2 98%   BMI 42.29 kg/m²     Objective   Physical Exam   Constitutional: She appears well-developed and well-nourished. No distress.   HENT:   Head: Normocephalic.   Right Ear: Tympanic membrane normal.   Left Ear: Tympanic membrane normal.   Nose: Mucosal edema present. Right sinus exhibits frontal sinus tenderness. Left sinus exhibits frontal sinus tenderness.   Mouth/Throat: Uvula is midline, oropharynx is clear and moist and mucous membranes are normal.   Neck: No tracheal deviation present. No thyromegaly present.   Cardiovascular: Normal rate, regular rhythm and normal heart sounds.   Pulmonary/Chest: Effort normal and breath sounds normal.   Musculoskeletal: She exhibits no edema.        Right shoulder: She exhibits decreased range of motion (decreased external rotation and flexion), tenderness and spasm. She exhibits no crepitus and normal strength.        Cervical back: She exhibits decreased range of motion, bony tenderness (diffuse Cspine), pain and spasm (R SCM, Champ trap, Champ CPS, champ L/S). Tenderness: L rotation worst as R SCM spasm severe.        Right hand: Normal strength noted.        Left hand: Normal strength noted.       Lymphadenopathy:     She has no cervical adenopathy.   Neurological: She has normal strength. No sensory deficit. She exhibits normal muscle tone. Gait normal.   Reflex Scores:       Tricep reflexes are 2+ on the right side and 2+ on the left side.       Bicep reflexes are 2+ on the right side and 2+ on the left side.       Brachioradialis reflexes are 2+ on the right side and 2+ on the left side.  Skin: Skin is warm and dry. She is not diaphoretic.   Psychiatric: She has a normal mood and affect. Her speech is normal and behavior is normal. Judgment and thought content normal.   Nursing note and vitals reviewed.    Assessment/Plan   Problems Addressed this Visit     None      Visit " Diagnoses     Cervical radicular pain    -  Primary    Relevant Medications    methocarbamol (ROBAXIN) 500 MG tablet    Other Relevant Orders    Ambulatory Referral to Physical Therapy Evaluate and treat    Acute non-recurrent frontal sinusitis        Relevant Medications    amoxicillin (AMOXIL) 500 MG capsule        Cervical radicular pain--Reviewed xray from [facility], Cspine was normal with preserved disc spaces, diagnosed with acute torticollis, has more radicular type pain, no overt weakness on exam. May need additional steroids. Start PT, additional ROM exercises given. Thinks will be able to RTW tomorrow, if not ok for work note. Rotate to less sedating MR. FU 2 weeks if not significantly settling.   Sinusitis--start antibiotics and start symptom controllers and FU if not settling 1 week.

## 2020-01-29 ENCOUNTER — DOCUMENTATION (OUTPATIENT)
Dept: PHYSICAL THERAPY | Facility: CLINIC | Age: 35
End: 2020-01-29

## 2021-04-16 ENCOUNTER — BULK ORDERING (OUTPATIENT)
Dept: CASE MANAGEMENT | Facility: OTHER | Age: 36
End: 2021-04-16

## 2021-04-16 DIAGNOSIS — Z23 IMMUNIZATION DUE: ICD-10-CM

## 2025-06-19 ENCOUNTER — OFFICE VISIT (OUTPATIENT)
Dept: INTERNAL MEDICINE | Facility: CLINIC | Age: 40
End: 2025-06-19
Payer: COMMERCIAL

## 2025-06-19 VITALS
BODY MASS INDEX: 43.47 KG/M2 | DIASTOLIC BLOOD PRESSURE: 80 MMHG | OXYGEN SATURATION: 98 % | SYSTOLIC BLOOD PRESSURE: 116 MMHG | WEIGHT: 277 LBS | TEMPERATURE: 98.5 F | HEIGHT: 67 IN | HEART RATE: 105 BPM

## 2025-06-19 DIAGNOSIS — Z13.6 SCREENING FOR CARDIOVASCULAR CONDITION: ICD-10-CM

## 2025-06-19 DIAGNOSIS — N91.2 AMENORRHEA: ICD-10-CM

## 2025-06-19 DIAGNOSIS — Z13.1 SCREENING FOR DIABETES MELLITUS: ICD-10-CM

## 2025-06-19 DIAGNOSIS — Z76.89 ENCOUNTER TO ESTABLISH CARE: Primary | ICD-10-CM

## 2025-06-19 DIAGNOSIS — F33.1 MODERATE EPISODE OF RECURRENT MAJOR DEPRESSIVE DISORDER: Chronic | ICD-10-CM

## 2025-06-19 DIAGNOSIS — F41.9 ANXIETY: Chronic | ICD-10-CM

## 2025-06-19 DIAGNOSIS — R41.89 BRAIN FOG: ICD-10-CM

## 2025-06-19 DIAGNOSIS — R53.83 FATIGUE, UNSPECIFIED TYPE: ICD-10-CM

## 2025-06-19 DIAGNOSIS — R00.2 PALPITATIONS: Chronic | ICD-10-CM

## 2025-06-19 DIAGNOSIS — E01.0 THYROMEGALY: ICD-10-CM

## 2025-06-19 DIAGNOSIS — R00.0 TACHYCARDIA: Chronic | ICD-10-CM

## 2025-06-19 RX ORDER — BUPROPION HYDROCHLORIDE 150 MG/1
150 TABLET ORAL DAILY
Qty: 30 TABLET | Refills: 1 | Status: SHIPPED | OUTPATIENT
Start: 2025-06-19

## 2025-06-19 NOTE — PROGRESS NOTES
Chief Complaint  Establish Care, Anxiety, Depression, ADD, and Thyroid Problem     Subjective:      History of Present Illness {CC  Problem List  Visit  Diagnosis   Encounters  Notes  Medications  Labs  Result Review Imaging  Media :23}     Joshua Ceja presents to [facility] for:      History of Present Illness        The patient presents to establish care with a new provider.    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 She had been under the care of a therapist until the closure of the practice. She has not previously taken any medication for anxiety or depression. She was prescribed phentermine for weight loss, which exacerbated her anxiety, leading to its discontinuation. She has not undergone any recent lab work. She also reports symptoms suggestive of ADHD, including difficulty focusing and poor academic performance despite adequate study time. She has consulted a psychologist who suggested the possibility of sleep apnea and recommended a sleep study.    She has a history of endometriosis, for which she underwent ablation surgery. A subsequent surgery for uterine removal was deemed too risky.    She reports a persistent lump in her neck that reduces in size but does not disappear completely. She has a history of gland removal due to recurrent enlargement.    She experiences occasional brain fog and palpitations. Her heart rate is typically elevated, ranging from 110s to 120s, with a peak recorded rate of 170s. She was previously on metoprolol, which was ineffective.    GYNECOLOGICAL HISTORY:  - Duration: 1-2 days  - Frequency and Flow: Light  - Menstrual Pain: Cramping    PAST SURGICAL HISTORY:  - Ablation surgery for endometriosis  - Tubes removed  - Gland removal in neck due to recurrent enlargement    FAMILY HISTORY  She does not have any family members with thyroid issues that she is  "aware of.         I have reviewed patient's medical history, any new submitted information provided by patient or medical assistant and updated medical record.      Objective:      Physical Exam  Vitals reviewed.   Constitutional:       Appearance: Normal appearance.   HENT:      Head: Normocephalic.   Neck:      Thyroid: Thyroid mass and thyromegaly present.     Cardiovascular:      Rate and Rhythm: Normal rate and regular rhythm.      Pulses: Normal pulses.      Heart sounds: Normal heart sounds.   Pulmonary:      Effort: Pulmonary effort is normal.      Breath sounds: Normal breath sounds.   Skin:     General: Skin is warm and dry.      Capillary Refill: Capillary refill takes less than 2 seconds.   Neurological:      General: No focal deficit present.      Mental Status: She is alert.   Psychiatric:         Mood and Affect: Mood normal.         Behavior: Behavior normal.        Result Review  Data Reviewed:{ Labs  Result Review  Imaging  Med Tab  Media :23}     Results                    Vital Signs:   /80 (BP Location: Left arm, Patient Position: Sitting, Cuff Size: Large Adult)   Pulse 105   Temp 98.5 °F (36.9 °C) (Oral)   Ht 170.2 cm (67\")   Wt 126 kg (277 lb)   SpO2 98%   BMI 43.38 kg/m²                 Requested Prescriptions     Signed Prescriptions Disp Refills    buPROPion XL (Wellbutrin XL) 150 MG 24 hr tablet 30 tablet 1     Sig: Take 1 tablet by mouth Daily.       Routine medications provided by this office will also be refilled via pharmacy request.       Current Outpatient Medications:     buPROPion XL (Wellbutrin XL) 150 MG 24 hr tablet, Take 1 tablet by mouth Daily., Disp: 30 tablet, Rfl: 1     Assessment and Plan:      Assessment and Plan {CC Problem List  Visit Diagnosis  ROS  Review (Popup)  Health Maintenance  Quality  BestPractice  Medications  SmartSets  SnapShot Encounters  Media :23}     Problem List Items Addressed This Visit       Moderate episode of recurrent " major depressive disorder (Chronic)    Relevant Medications    buPROPion XL (Wellbutrin XL) 150 MG 24 hr tablet    Other Relevant Orders    Ambulatory Referral to Behavioral Health (Completed)    Comprehensive Metabolic Panel    Anxiety (Chronic)    Relevant Medications    buPROPion XL (Wellbutrin XL) 150 MG 24 hr tablet    Other Relevant Orders    Ambulatory Referral to Behavioral Health (Completed)    Thyroid Peroxidase Antibody    TSH    T4, free    US Thyroid    Thyromegaly    Relevant Orders    Thyroid Peroxidase Antibody    TSH    T4, free    US Thyroid    Tachycardia (Chronic)    Relevant Orders    Comprehensive Metabolic Panel    Thyroid Peroxidase Antibody    TSH    T4, free    US Thyroid     Other Visit Diagnoses         Encounter to establish care    -  Primary      Fatigue, unspecified type        Relevant Orders    CBC & Differential    Luteinizing hormone    Follicle stimulating hormone    Testosterone (Free & Total), LC / MS    Thyroid Peroxidase Antibody    TSH    T4, free    US Thyroid      Brain fog        Relevant Orders    CBC & Differential    Luteinizing hormone    Follicle stimulating hormone    Testosterone (Free & Total), LC / MS      Palpitations  (Chronic)       Relevant Orders    Comprehensive Metabolic Panel    Thyroid Peroxidase Antibody    TSH    T4, free    US Thyroid      Screening for diabetes mellitus        Relevant Orders    Hemoglobin A1c      Amenorrhea        Relevant Orders    Luteinizing hormone    Follicle stimulating hormone    Testosterone (Free & Total), LC / MS      Screening for cardiovascular condition        Relevant Orders    Lipid Panel               1. Anxiety.  - Reports significant anxiety exacerbated by nursing school and previous use of phentermine.  - Discussed potential benefits and side effects of Wellbutrin, including its use for ADHD tendencies and non-sedating properties.  - Prescription for Wellbutrin 150 mg extended-release provided, with instructions  to take it either in the morning or at night; switch to morning if vivid dreams occur.  - Referral to psychiatric nurse practitioner for further evaluation and management of anxiety and potential ADHD; advised to message provider if Wellbutrin is not tolerated.    2. Endometriosis.  - History of endometriosis with ablation surgery; reports very light periods and occasional cramping.  - Hormone levels to be checked as part of lab work to assess for abnormalities.  - Referral to endocrinology will be considered if hormone levels are abnormal.    3. Thyroid nodule.  - Thyroid nodule palpated during physical examination.  - Additional thyroid labs, including TSH, T4, and thyroid antibody tests, will be ordered.  - Ultrasound of thyroid to be scheduled; referral to endocrinology if nodule persists or thyroid labs are abnormal.    4. Elevated heart rate.  - Reports naturally high heart rate, often in the 110s to 120s, with episodes up to the 170s.  - Labs ordered to check CBC, CMP, A1c, and cholesterol levels.  - Further evaluation and treatment will be considered if any abnormalities are found.    Follow-up  - Follow-up in 4 weeks for annual physical.     Patient verbalizes understanding and is comfortable with the plan of care.      Follow Up {Instructions Charge Capture  Follow-up Communications :23}     Return in about 4 weeks (around 7/17/2025) for Annual physical.      Patient was given instructions and counseling regarding her condition or for health maintenance advice. Please see specific information pulled into the AVS if appropriate.    Bari disclaimer:   Much of this encounter note is an electronic transcription/translation of spoken language to printed text. The electronic translation of spoken language may permit erroneous, or at times, nonsensical words or phrases to be inadvertently transcribed; Although I have reviewed the note for such errors, some may still exist.         Additional Patient  Counseling:       There are no Patient Instructions on file for this visit.    Patient or patient representative verbalized consent for the use of Ambient Listening during the visit with  ROXI De La Rosa for chart documentation. 6/19/2025  13:04 EDT

## 2025-06-24 LAB
ALBUMIN SERPL-MCNC: 4.1 G/DL (ref 3.5–5.2)
ALBUMIN/GLOB SERPL: 1.5 G/DL
ALP SERPL-CCNC: 83 U/L (ref 39–117)
ALT SERPL-CCNC: 13 U/L (ref 1–33)
AST SERPL-CCNC: 15 U/L (ref 1–32)
BASOPHILS # BLD AUTO: 0.04 10*3/MM3 (ref 0–0.2)
BASOPHILS NFR BLD AUTO: 0.7 % (ref 0–1.5)
BILIRUB SERPL-MCNC: 0.5 MG/DL (ref 0–1.2)
BUN SERPL-MCNC: 6 MG/DL (ref 6–20)
BUN/CREAT SERPL: 7.3 (ref 7–25)
CALCIUM SERPL-MCNC: 9.4 MG/DL (ref 8.6–10.5)
CHLORIDE SERPL-SCNC: 102 MMOL/L (ref 98–107)
CHOLEST SERPL-MCNC: 181 MG/DL (ref 0–200)
CO2 SERPL-SCNC: 26 MMOL/L (ref 22–29)
CREAT SERPL-MCNC: 0.82 MG/DL (ref 0.57–1)
EGFRCR SERPLBLD CKD-EPI 2021: 94.6 ML/MIN/1.73
EOSINOPHIL # BLD AUTO: 0.11 10*3/MM3 (ref 0–0.4)
EOSINOPHIL NFR BLD AUTO: 1.8 % (ref 0.3–6.2)
ERYTHROCYTE [DISTWIDTH] IN BLOOD BY AUTOMATED COUNT: 12.3 % (ref 12.3–15.4)
FSH SERPL-ACNC: 7.8 MIU/ML
GLOBULIN SER CALC-MCNC: 2.7 GM/DL
GLUCOSE SERPL-MCNC: 86 MG/DL (ref 65–99)
HBA1C MFR BLD: 4.9 % (ref 4.8–5.6)
HCT VFR BLD AUTO: 38.6 % (ref 34–46.6)
HDLC SERPL-MCNC: 65 MG/DL (ref 40–60)
HGB BLD-MCNC: 11.9 G/DL (ref 12–15.9)
IMM GRANULOCYTES # BLD AUTO: 0.01 10*3/MM3 (ref 0–0.05)
IMM GRANULOCYTES NFR BLD AUTO: 0.2 % (ref 0–0.5)
LDLC SERPL CALC-MCNC: 102 MG/DL (ref 0–100)
LH SERPL-ACNC: 12.1 MIU/ML
LYMPHOCYTES # BLD AUTO: 1.95 10*3/MM3 (ref 0.7–3.1)
LYMPHOCYTES NFR BLD AUTO: 32.8 % (ref 19.6–45.3)
MCH RBC QN AUTO: 27.1 PG (ref 26.6–33)
MCHC RBC AUTO-ENTMCNC: 30.8 G/DL (ref 31.5–35.7)
MCV RBC AUTO: 87.9 FL (ref 79–97)
MONOCYTES # BLD AUTO: 0.4 10*3/MM3 (ref 0.1–0.9)
MONOCYTES NFR BLD AUTO: 6.7 % (ref 5–12)
NEUTROPHILS # BLD AUTO: 3.44 10*3/MM3 (ref 1.7–7)
NEUTROPHILS NFR BLD AUTO: 57.8 % (ref 42.7–76)
NRBC BLD AUTO-RTO: 0 /100 WBC (ref 0–0.2)
PLATELET # BLD AUTO: 264 10*3/MM3 (ref 140–450)
POTASSIUM SERPL-SCNC: 4.3 MMOL/L (ref 3.5–5.2)
PROT SERPL-MCNC: 6.8 G/DL (ref 6–8.5)
RBC # BLD AUTO: 4.39 10*6/MM3 (ref 3.77–5.28)
SODIUM SERPL-SCNC: 140 MMOL/L (ref 136–145)
T4 FREE SERPL-MCNC: 1.04 NG/DL (ref 0.92–1.68)
TESTOST FREE SERPL-MCNC: 0.8 PG/ML (ref 0–4.2)
TESTOST SERPL-MCNC: 36.2 NG/DL (ref 10–55)
THYROPEROXIDASE AB SERPL-ACNC: 12 IU/ML (ref 0–34)
TRIGL SERPL-MCNC: 78 MG/DL (ref 0–150)
TSH SERPL DL<=0.005 MIU/L-ACNC: 1.19 UIU/ML (ref 0.27–4.2)
VLDLC SERPL CALC-MCNC: 14 MG/DL (ref 5–40)
WBC # BLD AUTO: 5.95 10*3/MM3 (ref 3.4–10.8)

## 2025-06-27 ENCOUNTER — HOSPITAL ENCOUNTER (OUTPATIENT)
Facility: HOSPITAL | Age: 40
Discharge: HOME | End: 2025-06-27
Payer: COMMERCIAL

## 2025-06-27 PROCEDURE — 76536 US EXAM OF HEAD AND NECK: CPT

## 2025-07-25 ENCOUNTER — OFFICE VISIT (OUTPATIENT)
Dept: INTERNAL MEDICINE | Facility: CLINIC | Age: 40
End: 2025-07-25
Payer: COMMERCIAL

## 2025-07-25 VITALS
OXYGEN SATURATION: 99 % | WEIGHT: 275.8 LBS | SYSTOLIC BLOOD PRESSURE: 122 MMHG | TEMPERATURE: 98.8 F | BODY MASS INDEX: 43.29 KG/M2 | HEIGHT: 67 IN | DIASTOLIC BLOOD PRESSURE: 88 MMHG | HEART RATE: 94 BPM

## 2025-07-25 DIAGNOSIS — R10.32 LLQ PAIN: ICD-10-CM

## 2025-07-25 DIAGNOSIS — F33.1 MODERATE EPISODE OF RECURRENT MAJOR DEPRESSIVE DISORDER: Chronic | ICD-10-CM

## 2025-07-25 DIAGNOSIS — F41.9 ANXIETY: Chronic | ICD-10-CM

## 2025-07-25 DIAGNOSIS — Z00.00 ROUTINE GENERAL MEDICAL EXAMINATION AT A HEALTH CARE FACILITY: Primary | ICD-10-CM

## 2025-07-25 RX ORDER — BUPROPION HYDROCHLORIDE 150 MG/1
150 TABLET ORAL DAILY
Qty: 90 TABLET | Refills: 3 | Status: SHIPPED | OUTPATIENT
Start: 2025-07-25

## 2025-07-25 NOTE — PROGRESS NOTES
Chief Complaint  Annual Exam, Anxiety, and Depression     Subjective:      History of Present Illness {CC  Problem List  Visit  Diagnosis   Encounters  Notes  Medications  Labs  Result Review Imaging  Media :23}     Joshua Ceja presents to [facility] for:      History of Present Illness        The patient presents for an annual exam.    She reports feeling well overall. She has been taking Wellbutrin at night, which she finds more effective than daytime administration. Initially, she experienced increased appetite and weight gain during the first 3 weeks of treatment. Daytime use of Wellbutrin resulted in fatigue and late-night wakefulness, leading her to switch to nighttime use. This change has improved her energy levels and mental clarity, allowing her to complete tasks such as gym workouts and food preparation. She has been advised to take propranolol only when feeling anxious, but she has only needed it once and did not notice a significant difference.    She maintains a regular exercise routine, attending the gym 3 times a week for an hour each session. Her OB/GYN is Dr. Lian Forde. She has no history of colon cancer and has never undergone a colonoscopy. Her last eye exam was in 04/2025. She received a COVID-19 vaccine in 2021. She reports no constipation and had a bowel movement yesterday. She has not noticed any changes in the knots in her neck. She has not had a Pap smear recently and believes she is due for one next year. She has a history of benign breast lumps, with one mass removed in 2017 or 2018.    She does not menstruate due to an ablation procedure. She is considering a hysterectomy due to severe cramps and a diagnosis of endometriosis. She still has her appendix.    Occupations: Nursing student  Tobacco: She does not smoke cigarettes.    PAST SURGICAL HISTORY:  - Ablation procedure  - Mass removed from breast (2017 or 2018)  - Gallbladder  removal    FAMILY HISTORY  Her mother had a breast cancer scare but did not undergo surgery. There is a family history of high blood pressure, diabetes, and high cholesterol on both her mother's and father's sides.     Joshua is here for coordination of medical care, to discuss health maintenance, disease prevention as well as to followup on medical problems.     Patient Care Team:  Ban Montgomery APRN as PCP - General (Nurse Practitioner)     Activity level is moderate.   Exercises 3 per week.      Health and Weight:   Weight trend is   Wt Readings from Last 4 Encounters:   07/25/25 125 kg (275 lb 12.8 oz)   06/19/25 126 kg (277 lb)   11/01/19 122 kg (270 lb)   03/08/19 123 kg (272 lb)       Class 3 Severe Obesity (BMI >=40). Obesity-related health conditions include the following: none. Obesity is improving with lifestyle modifications. BMI is is above average; BMI management plan is completed. We discussed low calorie, low carb based diet program, portion control, and increasing exercise.      Mental Health:   PHQ-2 Depression Screening  Little interest or pleasure in doing things?     Feeling down, depressed, or hopeless?     PHQ-2 Total Score       Health Maintenance Female:  GYN:  adithya byrne- 6/2025  Patient's last mammogram was  Last Completed Mammogram    This patient has no relevant Health Maintenance data.        Advised routine self-breast exams monthly.  Pap smears have been: normal   Postmenopausal: [] Yes     STI Screen:   [] HIV     [] Syphilis   [] Chlamydia/ Gonorrhea   [x] Declines STD screen  If tests ordered, patient was informed HIV results will not show up on my chart.     Colon cancer screen:   Patient's last colonoscopy was n/a   Last Completed Colonoscopy    This patient has no relevant Health Maintenance data.        She was advised to repeat in Patient declines due to age.    Vaccines:   [] Flu     [] Tdap   [] Pneumo 21 (Capvaxive)  [] Shingrix (shingles: series of 2)   []  COVID (booster)    []   []Declines vaccines      Last eye exam: 4/2025    Advise regular sunscreen.      Her cardiovascular risks are:     [] No Known risk factors    [] Hypertension   [x] Hyperlipidemia  [] Diabetes    [x] Obesity  [x] Family history   [] Current or hx tobacco use  [] Sedentary lifestyle   [] Post-menopausal      I have reviewed patient's medical history, any new submitted information provided by patient or medical assistant and updated medical record.      Objective:      Physical Exam  Vitals reviewed.   Constitutional:       General: She is awake. She is not in acute distress.     Appearance: Normal appearance. She is well-groomed. She is not ill-appearing, toxic-appearing or diaphoretic.   HENT:      Head: Normocephalic.      Right Ear: Hearing normal.      Left Ear: Hearing normal.      Nose: Nose normal.      Mouth/Throat:      Lips: Pink.      Mouth: Mucous membranes are moist.   Eyes:      General: Lids are normal. Vision grossly intact.         Right eye: No foreign body, discharge or hordeolum.         Left eye: No foreign body, discharge or hordeolum.      Extraocular Movements: Extraocular movements intact.      Conjunctiva/sclera: Conjunctivae normal.      Pupils: Pupils are equal, round, and reactive to light. Pupils are equal.   Neck:      Thyroid: Thyromegaly present. No thyroid mass or thyroid tenderness.      Vascular: No carotid bruit or JVD.      Trachea: Trachea and phonation normal.   Cardiovascular:      Rate and Rhythm: Normal rate and regular rhythm.      Pulses: Normal pulses.      Heart sounds: Normal heart sounds, S1 normal and S2 normal. No murmur heard.     No friction rub. No gallop.   Pulmonary:      Effort: Pulmonary effort is normal. No respiratory distress.      Breath sounds: Normal breath sounds. No stridor, decreased air movement or transmitted upper airway sounds. No decreased breath sounds, wheezing, rhonchi or rales.   Chest:      Chest wall: No tenderness.    Abdominal:      General: Abdomen is flat. Bowel sounds are normal. There is no distension.      Palpations: Abdomen is soft. There is no mass.      Tenderness: There is abdominal tenderness (LLQ). There is no right CVA tenderness, left CVA tenderness, guarding or rebound.      Hernia: No hernia is present.   Musculoskeletal:         General: No swelling, tenderness, deformity or signs of injury. Normal range of motion.      Cervical back: Normal, full passive range of motion without pain and normal range of motion. No rigidity or tenderness.      Thoracic back: Normal.      Lumbar back: Normal.      Right lower leg: No edema.      Left lower leg: No edema.   Lymphadenopathy:      Head:      Right side of head: No submental, submandibular, tonsillar or preauricular adenopathy.      Left side of head: No submental, submandibular, tonsillar or preauricular adenopathy.      Cervical: No cervical adenopathy.   Skin:     General: Skin is warm and dry.      Capillary Refill: Capillary refill takes less than 2 seconds.      Coloration: Skin is not jaundiced or pale.      Findings: No bruising, erythema, lesion or rash.   Neurological:      General: No focal deficit present.      Mental Status: She is alert and oriented to person, place, and time. Mental status is at baseline.      Motor: No weakness.      Gait: Gait normal.   Psychiatric:         Attention and Perception: Attention and perception normal.         Mood and Affect: Mood and affect normal.         Speech: Speech normal.         Behavior: Behavior normal. Behavior is cooperative.         Thought Content: Thought content normal.         Cognition and Memory: Cognition and memory normal.         Judgment: Judgment normal.        Result Review  Data Reviewed:{ Labs  Result Review  Imaging  Med Tab  Media :23}     Results  Labs   - Lipid Panel: HDL and LDL cholesterol levels are out of range. LDL is 102.   - Hormone Levels: Normal                  Vital Signs:  "  /88 (BP Location: Left arm, Patient Position: Sitting, Cuff Size: Large Adult)   Pulse 94   Temp 98.8 °F (37.1 °C) (Oral)   Ht 170.2 cm (67\")   Wt 125 kg (275 lb 12.8 oz)   SpO2 99%   BMI 43.20 kg/m²                 Requested Prescriptions     Signed Prescriptions Disp Refills    buPROPion XL (Wellbutrin XL) 150 MG 24 hr tablet 90 tablet 3     Sig: Take 1 tablet by mouth Daily.       Routine medications provided by this office will also be refilled via pharmacy request.       Current Outpatient Medications:     buPROPion XL (Wellbutrin XL) 150 MG 24 hr tablet, Take 1 tablet by mouth Daily., Disp: 90 tablet, Rfl: 3    propranolol (INDERAL) 10 MG tablet, Take 1-2 tablets by mouth Daily As Needed for anxiety, Disp: 60 tablet, Rfl: 0     Assessment and Plan:      Assessment and Plan {CC Problem List  Visit Diagnosis  ROS  Review (Popup)  Health Maintenance  Quality  BestPractice  Medications  SmartSets  SnapShot Encounters  Media :23}     Problem List Items Addressed This Visit       Moderate episode of recurrent major depressive disorder (Chronic)    Relevant Medications    buPROPion XL (Wellbutrin XL) 150 MG 24 hr tablet    Anxiety (Chronic)    Relevant Medications    buPROPion XL (Wellbutrin XL) 150 MG 24 hr tablet     Other Visit Diagnoses         Routine general medical examination at a health care facility    -  Primary      LLQ pain                   1. Anxiety.  - Currently taking Wellbutrin at night, which has improved symptoms without causing daytime fatigue.  - Reports feeling more productive and clear-headed.  - Advised to continue taking Wellbutrin at night.  - Prescription for Wellbutrin 90 days with refills will be provided.    2. Health Maintenance.  - Recent lab results, including hormone levels, are within normal limits. Thyroid function is normal.  - Advised to start annual mammograms at age 40, unless any abnormalities are detected earlier.  - Colonoscopies should commence " at age 45.  - Encouraged to monitor the knots in her neck for any changes; if changes are observed, a neck CT will be ordered.    3. Left lower quadrant pain.  - Differential diagnosis includes diverticulitis, colitis, or endometriosis.  - Advised to monitor for symptoms such as diarrhea, worsening left lower quadrant pain, or fevers, which could indicate diverticulitis.  - If symptoms worsen, further evaluation will be necessary.    Follow-up: The patient will follow up in 1 year.       Patient verbalizes understanding and is comfortable with the plan of care.    Follow Up {Instructions Charge Capture  Follow-up Communications :23}     Return in about 1 year (around 7/25/2026) for Annual physical.      Patient was given instructions and counseling regarding her condition or for health maintenance advice. Please see specific information pulled into the AVS if appropriate.    Dragon disclaimer:   Much of this encounter note is an electronic transcription/translation of spoken language to printed text. The electronic translation of spoken language may permit erroneous, or at times, nonsensical words or phrases to be inadvertently transcribed; Although I have reviewed the note for such errors, some may still exist.         Additional Patient Counseling:       Patient Instructions   Preventive Care 21-39 Years Old, Female  Preventive care refers to lifestyle choices and visits with your health care provider that can promote health and wellness. Preventive care visits are also called wellness exams.  What can I expect for my preventive care visit?  Counseling  During your preventive care visit, your health care provider may ask about your:  Medical history, including:  Past medical problems.  Family medical history.  Pregnancy history.  Current health, including:  Menstrual cycle.  Method of birth control.  Emotional well-being.  Home life and relationship well-being.  Sexual activity and sexual health.  Lifestyle,  including:  Alcohol, nicotine or tobacco, and drug use.  Access to firearms.  Diet, exercise, and sleep habits.  Work and work environment.  Sunscreen use.  Safety issues such as seatbelt and bike helmet use.  Physical exam  Your health care provider may check your:  Height and weight. These may be used to calculate your BMI (body mass index). BMI is a measurement that tells if you are at a healthy weight.  Waist circumference. This measures the distance around your waistline. This measurement also tells if you are at a healthy weight and may help predict your risk of certain diseases, such as type 2 diabetes and high blood pressure.  Heart rate and blood pressure.  Body temperature.  Skin for abnormal spots.  What immunizations do I need?    Vaccines are usually given at various ages, according to a schedule. Your health care provider will recommend vaccines for you based on your age, medical history, and lifestyle or other factors, such as travel or where you work.  What tests do I need?  Screening  Your health care provider may recommend screening tests for certain conditions. This may include:  Pelvic exam and Pap test.  Lipid and cholesterol levels.  Diabetes screening. This is done by checking your blood sugar (glucose) after you have not eaten for a while (fasting).  Hepatitis B test.  Hepatitis C test.  HIV (human immunodeficiency virus) test.  STI (sexually transmitted infection) testing, if you are at risk.  BRCA-related cancer screening. This may be done if you have a family history of breast, ovarian, tubal, or peritoneal cancers.  Talk with your health care provider about your test results, treatment options, and if necessary, the need for more tests.  Follow these instructions at home:  Eating and drinking    Eat a healthy diet that includes fresh fruits and vegetables, whole grains, lean protein, and low-fat dairy products.  Take vitamin and mineral supplements as recommended by your health care  provider.  Do not drink alcohol if:  Your health care provider tells you not to drink.  You are pregnant, may be pregnant, or are planning to become pregnant.  If you drink alcohol:  Limit how much you have to 0-1 drink a day.  Know how much alcohol is in your drink. In the [place] one drink equals one 12 oz bottle of beer (355 mL), one 5 oz glass of wine (148 mL), or one 1½ oz glass of hard liquor (44 mL).  Lifestyle  Brush your teeth every morning and night with fluoride toothpaste. Floss one time each day.  Exercise for at least 30 minutes 5 or more days each week.  Do not use any products that contain nicotine or tobacco. These products include cigarettes, chewing tobacco, and vaping devices, such as e-cigarettes. If you need help quitting, ask your health care provider.  Do not use drugs.  If you are sexually active, practice safe sex. Use a condom or other form of protection to prevent STIs.  If you do not wish to become pregnant, use a form of birth control. If you plan to become pregnant, see your health care provider for a prepregnancy visit.  Find healthy ways to manage stress, such as:  Meditation, yoga, or listening to music.  Journaling.  Talking to a trusted person.  Spending time with friends and family.  Minimize exposure to UV radiation to reduce your risk of skin cancer.  Safety  Always wear your seat belt while driving or riding in a vehicle.  Do not drive:  If you have been drinking alcohol. Do not ride with someone who has been drinking.  If you have been using any mind-altering substances or drugs.  While texting.  When you are tired or distracted.  Wear a helmet and other protective equipment during sports activities.  If you have firearms in your house, make sure you follow all gun safety procedures.  Seek help if you have been physically or sexually abused.  What's next?  Go to your health care provider once a year for an annual wellness visit.  Ask your health care provider how often you  should have your eyes and teeth checked.  Stay up to date on all vaccines.  This information is not intended to replace advice given to you by your health care provider. Make sure you discuss any questions you have with your health care provider.  Document Revised: 06/15/2022 Document Reviewed: 06/15/2022  Elsevier Patient Education © 2024 [employer]     Patient or patient representative verbalized consent for the use of Ambient Listening during the visit with  ROXI De La Rosa for chart documentation. 7/25/2025  12:01 EDT

## 2025-08-12 ENCOUNTER — SPECIALTY PHARMACY (OUTPATIENT)
Dept: PHARMACY | Facility: TELEHEALTH | Age: 40
End: 2025-08-12
Payer: COMMERCIAL

## 2025-08-12 PROBLEM — G43.909 MIGRAINE: Status: ACTIVE | Noted: 2025-08-12
